# Patient Record
Sex: MALE | Race: WHITE | NOT HISPANIC OR LATINO | Employment: FULL TIME | URBAN - METROPOLITAN AREA
[De-identification: names, ages, dates, MRNs, and addresses within clinical notes are randomized per-mention and may not be internally consistent; named-entity substitution may affect disease eponyms.]

---

## 2018-10-30 ENCOUNTER — APPOINTMENT (OUTPATIENT)
Dept: PREADMISSION TESTING | Facility: HOSPITAL | Age: 53
End: 2018-10-30

## 2018-10-30 ENCOUNTER — HOSPITAL ENCOUNTER (OUTPATIENT)
Dept: GENERAL RADIOLOGY | Facility: HOSPITAL | Age: 53
Discharge: HOME | End: 2018-10-30
Admitting: ORTHOPAEDIC SURGERY

## 2018-10-30 VITALS
SYSTOLIC BLOOD PRESSURE: 123 MMHG | TEMPERATURE: 97.8 F | BODY MASS INDEX: 27.15 KG/M2 | WEIGHT: 173 LBS | OXYGEN SATURATION: 98 % | HEART RATE: 59 BPM | HEIGHT: 67 IN | RESPIRATION RATE: 20 BRPM | DIASTOLIC BLOOD PRESSURE: 78 MMHG

## 2018-10-30 LAB
25(OH)D3 SERPL-MCNC: 34.5 NG/ML (ref 30–100)
ABO GROUP BLD: NORMAL
ALBUMIN SERPL-MCNC: 4.4 G/DL (ref 3.5–5.2)
ALBUMIN/GLOB SERPL: 1.8 G/DL
ALP SERPL-CCNC: 65 U/L (ref 39–117)
ALT SERPL W P-5'-P-CCNC: 19 U/L (ref 1–41)
ANION GAP SERPL CALCULATED.3IONS-SCNC: 10.4 MMOL/L
APTT PPP: 24 SECONDS (ref 22.7–35.4)
AST SERPL-CCNC: 17 U/L (ref 1–40)
BASOPHILS # BLD AUTO: 0.04 10*3/MM3 (ref 0–0.2)
BASOPHILS NFR BLD AUTO: 0.8 % (ref 0–1.5)
BILIRUB SERPL-MCNC: 0.5 MG/DL (ref 0.1–1.2)
BILIRUB UR QL STRIP: NEGATIVE
BLD GP AB SCN SERPL QL: NEGATIVE
BUN BLD-MCNC: 14 MG/DL (ref 6–20)
BUN/CREAT SERPL: 16.5 (ref 7–25)
CALCIUM SPEC-SCNC: 9.4 MG/DL (ref 8.6–10.5)
CHLORIDE SERPL-SCNC: 102 MMOL/L (ref 98–107)
CLARITY UR: CLEAR
CO2 SERPL-SCNC: 26.6 MMOL/L (ref 22–29)
COLOR UR: YELLOW
CREAT BLD-MCNC: 0.85 MG/DL (ref 0.76–1.27)
DEPRECATED RDW RBC AUTO: 44.7 FL (ref 37–54)
EOSINOPHIL # BLD AUTO: 0.02 10*3/MM3 (ref 0–0.7)
EOSINOPHIL NFR BLD AUTO: 0.4 % (ref 0.3–6.2)
ERYTHROCYTE [DISTWIDTH] IN BLOOD BY AUTOMATED COUNT: 12.8 % (ref 11.5–14.5)
GFR SERPL CREATININE-BSD FRML MDRD: 95 ML/MIN/1.73
GLOBULIN UR ELPH-MCNC: 2.5 GM/DL
GLUCOSE BLD-MCNC: 101 MG/DL (ref 65–99)
GLUCOSE UR STRIP-MCNC: NEGATIVE MG/DL
HCT VFR BLD AUTO: 48.4 % (ref 40.4–52.2)
HGB BLD-MCNC: 15.1 G/DL (ref 13.7–17.6)
HGB UR QL STRIP.AUTO: NEGATIVE
IMM GRANULOCYTES # BLD: 0 10*3/MM3 (ref 0–0.03)
IMM GRANULOCYTES NFR BLD: 0 % (ref 0–0.5)
INR PPP: 0.96 (ref 0.9–1.1)
KETONES UR QL STRIP: NEGATIVE
LEUKOCYTE ESTERASE UR QL STRIP.AUTO: NEGATIVE
LYMPHOCYTES # BLD AUTO: 1.77 10*3/MM3 (ref 0.9–4.8)
LYMPHOCYTES NFR BLD AUTO: 36.9 % (ref 19.6–45.3)
MCH RBC QN AUTO: 29.7 PG (ref 27–32.7)
MCHC RBC AUTO-ENTMCNC: 31.2 G/DL (ref 32.6–36.4)
MCV RBC AUTO: 95.1 FL (ref 79.8–96.2)
MONOCYTES # BLD AUTO: 0.29 10*3/MM3 (ref 0.2–1.2)
MONOCYTES NFR BLD AUTO: 6 % (ref 5–12)
NEUTROPHILS # BLD AUTO: 2.68 10*3/MM3 (ref 1.9–8.1)
NEUTROPHILS NFR BLD AUTO: 55.9 % (ref 42.7–76)
NITRITE UR QL STRIP: NEGATIVE
PH UR STRIP.AUTO: 5.5 [PH] (ref 5–8)
PLATELET # BLD AUTO: 219 10*3/MM3 (ref 140–500)
PMV BLD AUTO: 10.3 FL (ref 6–12)
POTASSIUM BLD-SCNC: 4.5 MMOL/L (ref 3.5–5.2)
PROT SERPL-MCNC: 6.9 G/DL (ref 6–8.5)
PROT UR QL STRIP: NEGATIVE
PROTHROMBIN TIME: 12.6 SECONDS (ref 11.7–14.2)
RBC # BLD AUTO: 5.09 10*6/MM3 (ref 4.6–6)
RH BLD: POSITIVE
SODIUM BLD-SCNC: 139 MMOL/L (ref 136–145)
SP GR UR STRIP: 1.02 (ref 1–1.03)
T&S EXPIRATION DATE: NORMAL
UROBILINOGEN UR QL STRIP: NORMAL
WBC NRBC COR # BLD: 4.8 10*3/MM3 (ref 4.5–10.7)

## 2018-10-30 PROCEDURE — 85610 PROTHROMBIN TIME: CPT | Performed by: ORTHOPAEDIC SURGERY

## 2018-10-30 PROCEDURE — 80053 COMPREHEN METABOLIC PANEL: CPT | Performed by: ORTHOPAEDIC SURGERY

## 2018-10-30 PROCEDURE — 93010 ELECTROCARDIOGRAM REPORT: CPT | Performed by: INTERNAL MEDICINE

## 2018-10-30 PROCEDURE — 86850 RBC ANTIBODY SCREEN: CPT | Performed by: ORTHOPAEDIC SURGERY

## 2018-10-30 PROCEDURE — 71046 X-RAY EXAM CHEST 2 VIEWS: CPT

## 2018-10-30 PROCEDURE — 81003 URINALYSIS AUTO W/O SCOPE: CPT | Performed by: ORTHOPAEDIC SURGERY

## 2018-10-30 PROCEDURE — 36415 COLL VENOUS BLD VENIPUNCTURE: CPT

## 2018-10-30 PROCEDURE — 85025 COMPLETE CBC W/AUTO DIFF WBC: CPT | Performed by: ORTHOPAEDIC SURGERY

## 2018-10-30 PROCEDURE — 85730 THROMBOPLASTIN TIME PARTIAL: CPT | Performed by: ORTHOPAEDIC SURGERY

## 2018-10-30 PROCEDURE — 82306 VITAMIN D 25 HYDROXY: CPT | Performed by: ORTHOPAEDIC SURGERY

## 2018-10-30 PROCEDURE — 86900 BLOOD TYPING SEROLOGIC ABO: CPT | Performed by: ORTHOPAEDIC SURGERY

## 2018-10-30 PROCEDURE — 86901 BLOOD TYPING SEROLOGIC RH(D): CPT | Performed by: ORTHOPAEDIC SURGERY

## 2018-10-30 PROCEDURE — 93005 ELECTROCARDIOGRAM TRACING: CPT

## 2018-10-30 RX ORDER — HYDROCODONE BITARTRATE AND ACETAMINOPHEN 7.5; 325 MG/1; MG/1
1 TABLET ORAL EVERY 6 HOURS PRN
COMMUNITY
End: 2018-11-03 | Stop reason: HOSPADM

## 2018-10-30 RX ORDER — ASPIRIN 325 MG
325 TABLET ORAL DAILY
COMMUNITY
End: 2018-11-03 | Stop reason: HOSPADM

## 2018-10-31 ENCOUNTER — HOSPITAL ENCOUNTER (INPATIENT)
Facility: HOSPITAL | Age: 53
LOS: 3 days | Discharge: HOME | End: 2018-11-03
Attending: ORTHOPAEDIC SURGERY | Admitting: ORTHOPAEDIC SURGERY

## 2018-10-31 ENCOUNTER — APPOINTMENT (OUTPATIENT)
Dept: GENERAL RADIOLOGY | Facility: HOSPITAL | Age: 53
End: 2018-10-31

## 2018-10-31 ENCOUNTER — ANESTHESIA (OUTPATIENT)
Dept: PERIOP | Facility: HOSPITAL | Age: 53
End: 2018-10-31

## 2018-10-31 ENCOUNTER — ANESTHESIA EVENT (OUTPATIENT)
Dept: PERIOP | Facility: HOSPITAL | Age: 53
End: 2018-10-31

## 2018-10-31 PROBLEM — M48.02 CERVICAL SPINAL STENOSIS: Status: ACTIVE | Noted: 2018-10-31

## 2018-10-31 PROCEDURE — 25010000002 MIDAZOLAM PER 1 MG

## 2018-10-31 PROCEDURE — C1713 ANCHOR/SCREW BN/BN,TIS/BN: HCPCS | Performed by: ORTHOPAEDIC SURGERY

## 2018-10-31 PROCEDURE — 25010000002 HYDROMORPHONE PER 4 MG: Performed by: ANESTHESIOLOGY

## 2018-10-31 PROCEDURE — 76000 FLUOROSCOPY <1 HR PHYS/QHP: CPT

## 2018-10-31 PROCEDURE — 25010000002 MORPHINE PER 10 MG: Performed by: ANESTHESIOLOGY

## 2018-10-31 PROCEDURE — 25010000002 FENTANYL CITRATE (PF) 100 MCG/2ML SOLUTION: Performed by: NURSE ANESTHETIST, CERTIFIED REGISTERED

## 2018-10-31 PROCEDURE — 25010000002 KETOROLAC TROMETHAMINE PER 15 MG

## 2018-10-31 PROCEDURE — 25010000002 PROPOFOL 10 MG/ML EMULSION: Performed by: NURSE ANESTHETIST, CERTIFIED REGISTERED

## 2018-10-31 PROCEDURE — 25010000002 METHOCARBAMOL 1000 MG/10ML SOLUTION: Performed by: ORTHOPAEDIC SURGERY

## 2018-10-31 PROCEDURE — 0RB30ZZ EXCISION OF CERVICAL VERTEBRAL DISC, OPEN APPROACH: ICD-10-PCS | Performed by: ORTHOPAEDIC SURGERY

## 2018-10-31 PROCEDURE — 25010000002 ONDANSETRON PER 1 MG: Performed by: ANESTHESIOLOGY

## 2018-10-31 PROCEDURE — 25010000002 FENTANYL CITRATE (PF) 100 MCG/2ML SOLUTION: Performed by: ANESTHESIOLOGY

## 2018-10-31 PROCEDURE — 25010000002 SUCCINYLCHOLINE PER 20 MG: Performed by: NURSE ANESTHETIST, CERTIFIED REGISTERED

## 2018-10-31 PROCEDURE — 25010000002 MIDAZOLAM PER 1 MG: Performed by: ANESTHESIOLOGY

## 2018-10-31 PROCEDURE — 0RG20A0 FUSION OF 2 OR MORE CERVICAL VERTEBRAL JOINTS WITH INTERBODY FUSION DEVICE, ANTERIOR APPROACH, ANTERIOR COLUMN, OPEN APPROACH: ICD-10-PCS | Performed by: ORTHOPAEDIC SURGERY

## 2018-10-31 PROCEDURE — 72040 X-RAY EXAM NECK SPINE 2-3 VW: CPT

## 2018-10-31 PROCEDURE — 25010000002 HYDROMORPHONE PER 4 MG: Performed by: NURSE ANESTHETIST, CERTIFIED REGISTERED

## 2018-10-31 PROCEDURE — 25010000003 CEFAZOLIN IN DEXTROSE 2-4 GM/100ML-% SOLUTION: Performed by: ORTHOPAEDIC SURGERY

## 2018-10-31 PROCEDURE — 25010000002 MORPHINE PER 10 MG

## 2018-10-31 DEVICE — NOVEL CERVICAL SPACER, 7 MM LARGE, 7DEG LORDOTIC, PEEK
Type: IMPLANTABLE DEVICE | Site: SPINE CERVICAL | Status: FUNCTIONAL
Brand: NOVEL SPINAL SPACER SYSTEM

## 2018-10-31 DEVICE — ANTERIOR CERVICAL PLATE ASSEMBLY, 4-LEVEL, 064MM
Type: IMPLANTABLE DEVICE | Site: SPINE CERVICAL | Status: FUNCTIONAL
Brand: TRESTLE LUXE

## 2018-10-31 DEVICE — KT HEMOST ABS SURGIFOAM PWDR 1GRAM: Type: IMPLANTABLE DEVICE | Site: SPINE CERVICAL | Status: FUNCTIONAL

## 2018-10-31 DEVICE — 4.0MM VARIABLE ANGLE, SELF-DRILLING HEXALOBE SCREW, 14MM
Type: IMPLANTABLE DEVICE | Site: SPINE CERVICAL | Status: FUNCTIONAL
Brand: TRESTLE LUXE

## 2018-10-31 DEVICE — 4.0MM VARIABLE ANGLE, SELF-DRILLING HEXALOBE SCREW, 12MM
Type: IMPLANTABLE DEVICE | Site: SPINE CERVICAL | Status: FUNCTIONAL
Brand: TRESTLE LUXE

## 2018-10-31 DEVICE — WAX BONE HEMO NAT 2.5G: Type: IMPLANTABLE DEVICE | Site: SPINE CERVICAL | Status: FUNCTIONAL

## 2018-10-31 DEVICE — PUTTY DBM PROGENIX PLS 2.5CC: Type: IMPLANTABLE DEVICE | Site: SPINE CERVICAL | Status: FUNCTIONAL

## 2018-10-31 RX ORDER — GLYCOPYRROLATE 0.2 MG/ML
INJECTION INTRAMUSCULAR; INTRAVENOUS AS NEEDED
Status: DISCONTINUED | OUTPATIENT
Start: 2018-10-31 | End: 2018-10-31 | Stop reason: SURG

## 2018-10-31 RX ORDER — MIDAZOLAM HYDROCHLORIDE 1 MG/ML
2 INJECTION INTRAMUSCULAR; INTRAVENOUS ONCE
Status: COMPLETED | OUTPATIENT
Start: 2018-10-31 | End: 2018-10-31

## 2018-10-31 RX ORDER — METHOCARBAMOL 100 MG/ML
1000 INJECTION, SOLUTION INTRAMUSCULAR; INTRAVENOUS ONCE AS NEEDED
Status: COMPLETED | OUTPATIENT
Start: 2018-10-31 | End: 2018-10-31

## 2018-10-31 RX ORDER — HYDROCODONE BITARTRATE AND ACETAMINOPHEN 10; 325 MG/1; MG/1
1 TABLET ORAL EVERY 4 HOURS PRN
Status: DISCONTINUED | OUTPATIENT
Start: 2018-10-31 | End: 2018-11-01

## 2018-10-31 RX ORDER — HYDROCODONE BITARTRATE AND ACETAMINOPHEN 7.5; 325 MG/1; MG/1
1 TABLET ORAL ONCE AS NEEDED
Status: DISCONTINUED | OUTPATIENT
Start: 2018-10-31 | End: 2018-11-01 | Stop reason: HOSPADM

## 2018-10-31 RX ORDER — MIDAZOLAM HYDROCHLORIDE 1 MG/ML
INJECTION INTRAMUSCULAR; INTRAVENOUS
Status: COMPLETED
Start: 2018-10-31 | End: 2018-10-31

## 2018-10-31 RX ORDER — HYDROMORPHONE HYDROCHLORIDE 1 MG/ML
0.5 INJECTION, SOLUTION INTRAMUSCULAR; INTRAVENOUS; SUBCUTANEOUS
Status: DISCONTINUED | OUTPATIENT
Start: 2018-10-31 | End: 2018-11-01 | Stop reason: HOSPADM

## 2018-10-31 RX ORDER — FENTANYL CITRATE 50 UG/ML
INJECTION, SOLUTION INTRAMUSCULAR; INTRAVENOUS AS NEEDED
Status: DISCONTINUED | OUTPATIENT
Start: 2018-10-31 | End: 2018-10-31 | Stop reason: SURG

## 2018-10-31 RX ORDER — ONDANSETRON 2 MG/ML
4 INJECTION INTRAMUSCULAR; INTRAVENOUS ONCE AS NEEDED
Status: DISCONTINUED | OUTPATIENT
Start: 2018-10-31 | End: 2018-11-01 | Stop reason: HOSPADM

## 2018-10-31 RX ORDER — KETOROLAC TROMETHAMINE 30 MG/ML
30 INJECTION, SOLUTION INTRAMUSCULAR; INTRAVENOUS ONCE
Status: COMPLETED | OUTPATIENT
Start: 2018-10-31 | End: 2018-10-31

## 2018-10-31 RX ORDER — SUCCINYLCHOLINE CHLORIDE 20 MG/ML
INJECTION INTRAMUSCULAR; INTRAVENOUS AS NEEDED
Status: DISCONTINUED | OUTPATIENT
Start: 2018-10-31 | End: 2018-10-31 | Stop reason: SURG

## 2018-10-31 RX ORDER — PROMETHAZINE HYDROCHLORIDE 25 MG/ML
12.5 INJECTION, SOLUTION INTRAMUSCULAR; INTRAVENOUS ONCE AS NEEDED
Status: DISCONTINUED | OUTPATIENT
Start: 2018-10-31 | End: 2018-11-01 | Stop reason: HOSPADM

## 2018-10-31 RX ORDER — MORPHINE SULFATE 2 MG/ML
INJECTION, SOLUTION INTRAMUSCULAR; INTRAVENOUS
Status: COMPLETED
Start: 2018-10-31 | End: 2018-10-31

## 2018-10-31 RX ORDER — SODIUM CHLORIDE, SODIUM LACTATE, POTASSIUM CHLORIDE, CALCIUM CHLORIDE 600; 310; 30; 20 MG/100ML; MG/100ML; MG/100ML; MG/100ML
9 INJECTION, SOLUTION INTRAVENOUS CONTINUOUS
Status: DISCONTINUED | OUTPATIENT
Start: 2018-10-31 | End: 2018-11-03 | Stop reason: HOSPADM

## 2018-10-31 RX ORDER — DIPHENHYDRAMINE HCL 25 MG
25 CAPSULE ORAL EVERY 6 HOURS PRN
Status: DISCONTINUED | OUTPATIENT
Start: 2018-10-31 | End: 2018-11-01 | Stop reason: HOSPADM

## 2018-10-31 RX ORDER — HYDROCODONE BITARTRATE AND ACETAMINOPHEN 10; 325 MG/1; MG/1
1 TABLET ORAL EVERY 4 HOURS PRN
Status: DISCONTINUED | OUTPATIENT
Start: 2018-10-31 | End: 2018-10-31

## 2018-10-31 RX ORDER — OXYCODONE AND ACETAMINOPHEN 7.5; 325 MG/1; MG/1
1 TABLET ORAL ONCE AS NEEDED
Status: COMPLETED | OUTPATIENT
Start: 2018-10-31 | End: 2018-10-31

## 2018-10-31 RX ORDER — CEFAZOLIN SODIUM 2 G/100ML
2 INJECTION, SOLUTION INTRAVENOUS ONCE
Status: COMPLETED | OUTPATIENT
Start: 2018-10-31 | End: 2018-10-31

## 2018-10-31 RX ORDER — NALOXONE HCL 0.4 MG/ML
0.2 VIAL (ML) INJECTION AS NEEDED
Status: DISCONTINUED | OUTPATIENT
Start: 2018-10-31 | End: 2018-11-01 | Stop reason: HOSPADM

## 2018-10-31 RX ORDER — EPHEDRINE SULFATE 50 MG/ML
5 INJECTION, SOLUTION INTRAVENOUS ONCE AS NEEDED
Status: DISCONTINUED | OUTPATIENT
Start: 2018-10-31 | End: 2018-11-01 | Stop reason: HOSPADM

## 2018-10-31 RX ORDER — LIDOCAINE HYDROCHLORIDE 10 MG/ML
0.5 INJECTION, SOLUTION EPIDURAL; INFILTRATION; INTRACAUDAL; PERINEURAL ONCE AS NEEDED
Status: DISCONTINUED | OUTPATIENT
Start: 2018-10-31 | End: 2018-10-31 | Stop reason: HOSPADM

## 2018-10-31 RX ORDER — SODIUM CHLORIDE 0.9 % (FLUSH) 0.9 %
3-10 SYRINGE (ML) INJECTION AS NEEDED
Status: DISCONTINUED | OUTPATIENT
Start: 2018-10-31 | End: 2018-10-31 | Stop reason: HOSPADM

## 2018-10-31 RX ORDER — LIDOCAINE HYDROCHLORIDE 20 MG/ML
INJECTION, SOLUTION INFILTRATION; PERINEURAL AS NEEDED
Status: DISCONTINUED | OUTPATIENT
Start: 2018-10-31 | End: 2018-10-31 | Stop reason: SURG

## 2018-10-31 RX ORDER — FENTANYL CITRATE 50 UG/ML
100 INJECTION, SOLUTION INTRAMUSCULAR; INTRAVENOUS
Status: DISCONTINUED | OUTPATIENT
Start: 2018-10-31 | End: 2018-10-31 | Stop reason: HOSPADM

## 2018-10-31 RX ORDER — HYDROMORPHONE HCL IN 0.9% NACL 10 MG/50ML
PATIENT CONTROLLED ANALGESIA SYRINGE INTRAVENOUS CONTINUOUS
Status: DISPENSED | OUTPATIENT
Start: 2018-10-31 | End: 2018-11-01

## 2018-10-31 RX ORDER — ONDANSETRON 2 MG/ML
INJECTION INTRAMUSCULAR; INTRAVENOUS AS NEEDED
Status: DISCONTINUED | OUTPATIENT
Start: 2018-10-31 | End: 2018-10-31 | Stop reason: SURG

## 2018-10-31 RX ORDER — SODIUM CHLORIDE 0.9 % (FLUSH) 0.9 %
3 SYRINGE (ML) INJECTION EVERY 12 HOURS SCHEDULED
Status: DISCONTINUED | OUTPATIENT
Start: 2018-10-31 | End: 2018-10-31 | Stop reason: HOSPADM

## 2018-10-31 RX ORDER — PROMETHAZINE HYDROCHLORIDE 25 MG/1
25 SUPPOSITORY RECTAL ONCE AS NEEDED
Status: DISCONTINUED | OUTPATIENT
Start: 2018-10-31 | End: 2018-11-01 | Stop reason: HOSPADM

## 2018-10-31 RX ORDER — CYCLOBENZAPRINE HCL 10 MG
10 TABLET ORAL 3 TIMES DAILY PRN
Qty: 90 TABLET | Refills: 0 | Status: SHIPPED | OUTPATIENT
Start: 2018-10-31 | End: 2018-11-03 | Stop reason: HOSPADM

## 2018-10-31 RX ORDER — HYDROMORPHONE HCL 110MG/55ML
PATIENT CONTROLLED ANALGESIA SYRINGE INTRAVENOUS AS NEEDED
Status: DISCONTINUED | OUTPATIENT
Start: 2018-10-31 | End: 2018-10-31 | Stop reason: SURG

## 2018-10-31 RX ORDER — MORPHINE SULFATE 2 MG/ML
1 INJECTION, SOLUTION INTRAMUSCULAR; INTRAVENOUS
Status: DISCONTINUED | OUTPATIENT
Start: 2018-10-31 | End: 2018-11-01 | Stop reason: HOSPADM

## 2018-10-31 RX ORDER — FLUMAZENIL 0.1 MG/ML
0.2 INJECTION INTRAVENOUS AS NEEDED
Status: DISCONTINUED | OUTPATIENT
Start: 2018-10-31 | End: 2018-11-01 | Stop reason: HOSPADM

## 2018-10-31 RX ORDER — MIDAZOLAM HYDROCHLORIDE 1 MG/ML
1 INJECTION INTRAMUSCULAR; INTRAVENOUS
Status: DISCONTINUED | OUTPATIENT
Start: 2018-10-31 | End: 2018-10-31 | Stop reason: HOSPADM

## 2018-10-31 RX ORDER — PROMETHAZINE HYDROCHLORIDE 25 MG/1
25 TABLET ORAL ONCE AS NEEDED
Status: DISCONTINUED | OUTPATIENT
Start: 2018-10-31 | End: 2018-11-01 | Stop reason: HOSPADM

## 2018-10-31 RX ORDER — HYDROCODONE BITARTRATE AND ACETAMINOPHEN 10; 325 MG/1; MG/1
TABLET ORAL
Qty: 60 TABLET | Refills: 0 | Status: SHIPPED | OUTPATIENT
Start: 2018-10-31 | End: 2018-11-03 | Stop reason: HOSPADM

## 2018-10-31 RX ORDER — KETOROLAC TROMETHAMINE 30 MG/ML
INJECTION, SOLUTION INTRAMUSCULAR; INTRAVENOUS
Status: COMPLETED
Start: 2018-10-31 | End: 2018-10-31

## 2018-10-31 RX ORDER — HYDROCODONE BITARTRATE AND ACETAMINOPHEN 10; 325 MG/1; MG/1
2 TABLET ORAL EVERY 4 HOURS PRN
Status: DISCONTINUED | OUTPATIENT
Start: 2018-10-31 | End: 2018-11-01

## 2018-10-31 RX ORDER — MIDAZOLAM HYDROCHLORIDE 1 MG/ML
2 INJECTION INTRAMUSCULAR; INTRAVENOUS
Status: DISCONTINUED | OUTPATIENT
Start: 2018-10-31 | End: 2018-10-31 | Stop reason: HOSPADM

## 2018-10-31 RX ORDER — PROPOFOL 10 MG/ML
VIAL (ML) INTRAVENOUS AS NEEDED
Status: DISCONTINUED | OUTPATIENT
Start: 2018-10-31 | End: 2018-10-31 | Stop reason: SURG

## 2018-10-31 RX ORDER — FAMOTIDINE 10 MG/ML
20 INJECTION, SOLUTION INTRAVENOUS ONCE
Status: COMPLETED | OUTPATIENT
Start: 2018-10-31 | End: 2018-10-31

## 2018-10-31 RX ORDER — SODIUM CHLORIDE 0.9 % (FLUSH) 0.9 %
1-10 SYRINGE (ML) INJECTION AS NEEDED
Status: DISCONTINUED | OUTPATIENT
Start: 2018-10-31 | End: 2018-10-31 | Stop reason: HOSPADM

## 2018-10-31 RX ORDER — PROMETHAZINE HYDROCHLORIDE 25 MG/1
12.5 TABLET ORAL ONCE AS NEEDED
Status: DISCONTINUED | OUTPATIENT
Start: 2018-10-31 | End: 2018-11-01 | Stop reason: HOSPADM

## 2018-10-31 RX ORDER — LABETALOL HYDROCHLORIDE 5 MG/ML
5 INJECTION, SOLUTION INTRAVENOUS
Status: DISCONTINUED | OUTPATIENT
Start: 2018-10-31 | End: 2018-11-01 | Stop reason: HOSPADM

## 2018-10-31 RX ORDER — CYCLOBENZAPRINE HCL 10 MG
10 TABLET ORAL 3 TIMES DAILY PRN
Status: DISCONTINUED | OUTPATIENT
Start: 2018-10-31 | End: 2018-11-03 | Stop reason: HOSPADM

## 2018-10-31 RX ORDER — FENTANYL CITRATE 50 UG/ML
50 INJECTION, SOLUTION INTRAMUSCULAR; INTRAVENOUS
Status: DISCONTINUED | OUTPATIENT
Start: 2018-10-31 | End: 2018-11-01 | Stop reason: HOSPADM

## 2018-10-31 RX ADMIN — FENTANYL CITRATE 50 MCG: 50 INJECTION, SOLUTION INTRAMUSCULAR; INTRAVENOUS at 20:05

## 2018-10-31 RX ADMIN — ONDANSETRON 4 MG: 2 INJECTION INTRAMUSCULAR; INTRAVENOUS at 19:28

## 2018-10-31 RX ADMIN — FENTANYL CITRATE 50 MCG: 50 INJECTION, SOLUTION INTRAMUSCULAR; INTRAVENOUS at 21:25

## 2018-10-31 RX ADMIN — LIDOCAINE HYDROCHLORIDE 100 MG: 20 INJECTION, SOLUTION INFILTRATION; PERINEURAL at 16:43

## 2018-10-31 RX ADMIN — HYDROMORPHONE HYDROCHLORIDE 0.5 MG: 2 INJECTION INTRAMUSCULAR; INTRAVENOUS; SUBCUTANEOUS at 17:40

## 2018-10-31 RX ADMIN — METHOCARBAMOL 1000 MG: 100 INJECTION, SOLUTION INTRAMUSCULAR; INTRAVENOUS at 20:30

## 2018-10-31 RX ADMIN — MORPHINE SULFATE 1 MG: 2 INJECTION, SOLUTION INTRAMUSCULAR; INTRAVENOUS at 23:14

## 2018-10-31 RX ADMIN — OXYCODONE HYDROCHLORIDE AND ACETAMINOPHEN 1 TABLET: 7.5; 325 TABLET ORAL at 21:58

## 2018-10-31 RX ADMIN — KETOROLAC TROMETHAMINE 30 MG: 30 INJECTION, SOLUTION INTRAMUSCULAR at 22:55

## 2018-10-31 RX ADMIN — CYCLOBENZAPRINE 10 MG: 10 TABLET, FILM COATED ORAL at 21:42

## 2018-10-31 RX ADMIN — FENTANYL CITRATE 50 MCG: 50 INJECTION INTRAMUSCULAR; INTRAVENOUS at 13:37

## 2018-10-31 RX ADMIN — MORPHINE SULFATE 1 MG: 2 INJECTION, SOLUTION INTRAMUSCULAR; INTRAVENOUS at 22:46

## 2018-10-31 RX ADMIN — FAMOTIDINE 20 MG: 10 INJECTION, SOLUTION INTRAVENOUS at 13:00

## 2018-10-31 RX ADMIN — HYDROMORPHONE HYDROCHLORIDE 0.5 MG: 1 INJECTION, SOLUTION INTRAMUSCULAR; INTRAVENOUS; SUBCUTANEOUS at 21:35

## 2018-10-31 RX ADMIN — CEFAZOLIN SODIUM 2 G: 2 INJECTION, SOLUTION INTRAVENOUS at 16:50

## 2018-10-31 RX ADMIN — FENTANYL CITRATE 50 MCG: 50 INJECTION INTRAMUSCULAR; INTRAVENOUS at 13:01

## 2018-10-31 RX ADMIN — MIDAZOLAM 2 MG: 1 INJECTION INTRAMUSCULAR; INTRAVENOUS at 15:30

## 2018-10-31 RX ADMIN — FENTANYL CITRATE 50 MCG: 50 INJECTION, SOLUTION INTRAMUSCULAR; INTRAVENOUS at 17:20

## 2018-10-31 RX ADMIN — FENTANYL CITRATE 100 MCG: 50 INJECTION, SOLUTION INTRAMUSCULAR; INTRAVENOUS at 17:04

## 2018-10-31 RX ADMIN — FENTANYL CITRATE 50 MCG: 50 INJECTION, SOLUTION INTRAMUSCULAR; INTRAVENOUS at 19:55

## 2018-10-31 RX ADMIN — PROPOFOL 100 MG: 10 INJECTION, EMULSION INTRAVENOUS at 17:53

## 2018-10-31 RX ADMIN — HYDROMORPHONE HYDROCHLORIDE 0.5 MG: 2 INJECTION INTRAMUSCULAR; INTRAVENOUS; SUBCUTANEOUS at 17:47

## 2018-10-31 RX ADMIN — GLYCOPYRROLATE 0.2 MG: 0.2 INJECTION INTRAMUSCULAR; INTRAVENOUS at 17:47

## 2018-10-31 RX ADMIN — SODIUM CHLORIDE, POTASSIUM CHLORIDE, SODIUM LACTATE AND CALCIUM CHLORIDE: 600; 310; 30; 20 INJECTION, SOLUTION INTRAVENOUS at 19:43

## 2018-10-31 RX ADMIN — HYDROMORPHONE HYDROCHLORIDE: 10 INJECTION INTRAMUSCULAR; INTRAVENOUS; SUBCUTANEOUS at 20:35

## 2018-10-31 RX ADMIN — HYDROMORPHONE HYDROCHLORIDE 0.5 MG: 1 INJECTION, SOLUTION INTRAMUSCULAR; INTRAVENOUS; SUBCUTANEOUS at 20:00

## 2018-10-31 RX ADMIN — HYDROMORPHONE HYDROCHLORIDE 0.5 MG: 1 INJECTION, SOLUTION INTRAMUSCULAR; INTRAVENOUS; SUBCUTANEOUS at 22:24

## 2018-10-31 RX ADMIN — FENTANYL CITRATE 100 MCG: 50 INJECTION, SOLUTION INTRAMUSCULAR; INTRAVENOUS at 16:43

## 2018-10-31 RX ADMIN — MIDAZOLAM 2 MG: 1 INJECTION INTRAMUSCULAR; INTRAVENOUS at 20:55

## 2018-10-31 RX ADMIN — SUCCINYLCHOLINE CHLORIDE 140 MG: 20 INJECTION, SOLUTION INTRAMUSCULAR; INTRAVENOUS; PARENTERAL at 16:43

## 2018-10-31 RX ADMIN — EPHEDRINE SULFATE 15 MG: 50 INJECTION INTRAMUSCULAR; INTRAVENOUS; SUBCUTANEOUS at 17:47

## 2018-10-31 RX ADMIN — HYDROMORPHONE HYDROCHLORIDE 0.5 MG: 1 INJECTION, SOLUTION INTRAMUSCULAR; INTRAVENOUS; SUBCUTANEOUS at 22:33

## 2018-10-31 RX ADMIN — HYDROMORPHONE HYDROCHLORIDE 0.5 MG: 1 INJECTION, SOLUTION INTRAMUSCULAR; INTRAVENOUS; SUBCUTANEOUS at 20:25

## 2018-10-31 RX ADMIN — KETOROLAC TROMETHAMINE 30 MG: 30 INJECTION, SOLUTION INTRAMUSCULAR; INTRAVENOUS at 22:55

## 2018-10-31 RX ADMIN — MIDAZOLAM 2 MG: 1 INJECTION INTRAMUSCULAR; INTRAVENOUS at 13:00

## 2018-10-31 RX ADMIN — MORPHINE SULFATE 1 MG: 2 INJECTION, SOLUTION INTRAMUSCULAR; INTRAVENOUS at 22:56

## 2018-10-31 RX ADMIN — HYDROMORPHONE HYDROCHLORIDE 0.5 MG: 1 INJECTION, SOLUTION INTRAMUSCULAR; INTRAVENOUS; SUBCUTANEOUS at 21:34

## 2018-10-31 RX ADMIN — MIDAZOLAM HYDROCHLORIDE 2 MG: 1 INJECTION INTRAMUSCULAR; INTRAVENOUS at 20:55

## 2018-10-31 RX ADMIN — PROPOFOL 200 MG: 10 INJECTION, EMULSION INTRAVENOUS at 16:43

## 2018-10-31 RX ADMIN — FENTANYL CITRATE 50 MCG: 50 INJECTION, SOLUTION INTRAMUSCULAR; INTRAVENOUS at 20:20

## 2018-10-31 RX ADMIN — MORPHINE SULFATE 1 MG: 2 INJECTION, SOLUTION INTRAMUSCULAR; INTRAVENOUS at 23:42

## 2018-10-31 RX ADMIN — SODIUM CHLORIDE, POTASSIUM CHLORIDE, SODIUM LACTATE AND CALCIUM CHLORIDE 9 ML/HR: 600; 310; 30; 20 INJECTION, SOLUTION INTRAVENOUS at 13:18

## 2018-10-31 NOTE — ANESTHESIA PROCEDURE NOTES
Airway  Urgency: elective    Difficult airway    General Information and Staff    Patient location during procedure: OR  Anesthesiologist: SUNDAR FLOWER  CRNA: RAMSES BADILLO    Indications and Patient Condition  Indications for airway management: airway protection    Preoxygenated: yes  Mask difficulty assessment: 2 - vent by mask + OA or adjuvant +/- NMBA    Final Airway Details  Final airway type: endotracheal airway      Successful airway: ETT and NIM tube  Cuffed: yes   Successful intubation technique: video laryngoscopy  Endotracheal tube insertion site: oral  ETT size: 7.0 mm  Cormack-Lehane Classification: grade I - full view of glottis  Placement verified by: chest auscultation and capnometry     Additional Comments  Glide scope advanced atraumatically and intubated without difficulty.  Lips and teeth remain unchanged as preop condition.

## 2018-10-31 NOTE — ANESTHESIA PREPROCEDURE EVALUATION
Anesthesia Evaluation     Patient summary reviewed and Nursing notes reviewed   NPO Solid Status: > 8 hours             Airway   Mallampati: II  TM distance: <3 FB  Neck ROM: limited  no difficulty expected  Dental - normal exam     Pulmonary - negative pulmonary ROS and normal exam   Cardiovascular - negative cardio ROS and normal exam        Neuro/Psych- negative ROS  GI/Hepatic/Renal/Endo - negative ROS     Musculoskeletal (-) negative ROS    Abdominal  - normal exam   Substance History - negative use     OB/GYN negative ob/gyn ROS         Other                        Anesthesia Plan    ASA 2     general     intravenous induction   Anesthetic plan, all risks, benefits, and alternatives have been provided, discussed and informed consent has been obtained with: patient.    Plan discussed with CRNA.

## 2018-11-01 PROBLEM — F43.12 CHRONIC POST-TRAUMATIC STRESS DISORDER (PTSD): Status: ACTIVE | Noted: 2018-11-01

## 2018-11-01 PROBLEM — F41.9 ANXIETY: Status: ACTIVE | Noted: 2018-11-01

## 2018-11-01 PROBLEM — M19.90 ARTHRITIS: Status: ACTIVE | Noted: 2018-11-01

## 2018-11-01 LAB
ANION GAP SERPL CALCULATED.3IONS-SCNC: 10.1 MMOL/L
BUN BLD-MCNC: 9 MG/DL (ref 6–20)
BUN/CREAT SERPL: 11.4 (ref 7–25)
CALCIUM SPEC-SCNC: 8.5 MG/DL (ref 8.6–10.5)
CHLORIDE SERPL-SCNC: 100 MMOL/L (ref 98–107)
CO2 SERPL-SCNC: 25.9 MMOL/L (ref 22–29)
CREAT BLD-MCNC: 0.79 MG/DL (ref 0.76–1.27)
GFR SERPL CREATININE-BSD FRML MDRD: 103 ML/MIN/1.73
GLUCOSE BLD-MCNC: 103 MG/DL (ref 65–99)
HCT VFR BLD AUTO: 41 % (ref 40.4–52.2)
HGB BLD-MCNC: 12.8 G/DL (ref 13.7–17.6)
POTASSIUM BLD-SCNC: 4.1 MMOL/L (ref 3.5–5.2)
SODIUM BLD-SCNC: 136 MMOL/L (ref 136–145)

## 2018-11-01 PROCEDURE — 85014 HEMATOCRIT: CPT | Performed by: ORTHOPAEDIC SURGERY

## 2018-11-01 PROCEDURE — 63710000001 PROMETHAZINE PER 12.5 MG: Performed by: ORTHOPAEDIC SURGERY

## 2018-11-01 PROCEDURE — 25010000002 ONDANSETRON PER 1 MG: Performed by: ORTHOPAEDIC SURGERY

## 2018-11-01 PROCEDURE — 97162 PT EVAL MOD COMPLEX 30 MIN: CPT

## 2018-11-01 PROCEDURE — 85018 HEMOGLOBIN: CPT | Performed by: ORTHOPAEDIC SURGERY

## 2018-11-01 PROCEDURE — 97110 THERAPEUTIC EXERCISES: CPT

## 2018-11-01 PROCEDURE — 80048 BASIC METABOLIC PNL TOTAL CA: CPT | Performed by: ORTHOPAEDIC SURGERY

## 2018-11-01 PROCEDURE — 25010000003 CEFAZOLIN IN DEXTROSE 2-4 GM/100ML-% SOLUTION: Performed by: ORTHOPAEDIC SURGERY

## 2018-11-01 PROCEDURE — 63710000001 METHYLPREDNISOLONE 4 MG TABLET THERAPY PACK 21 EACH DISP PACK: Performed by: PHYSICIAN ASSISTANT

## 2018-11-01 RX ORDER — CEFAZOLIN SODIUM 2 G/100ML
2 INJECTION, SOLUTION INTRAVENOUS EVERY 8 HOURS
Status: DISCONTINUED | OUTPATIENT
Start: 2018-11-01 | End: 2018-11-01

## 2018-11-01 RX ORDER — ONDANSETRON 4 MG/1
4 TABLET, FILM COATED ORAL EVERY 6 HOURS PRN
Status: DISCONTINUED | OUTPATIENT
Start: 2018-11-01 | End: 2018-11-03 | Stop reason: HOSPADM

## 2018-11-01 RX ORDER — BISACODYL 5 MG/1
5 TABLET, DELAYED RELEASE ORAL DAILY PRN
Status: DISCONTINUED | OUTPATIENT
Start: 2018-11-01 | End: 2018-11-03 | Stop reason: HOSPADM

## 2018-11-01 RX ORDER — BISACODYL 10 MG
10 SUPPOSITORY, RECTAL RECTAL DAILY PRN
Status: DISCONTINUED | OUTPATIENT
Start: 2018-11-01 | End: 2018-11-03 | Stop reason: HOSPADM

## 2018-11-01 RX ORDER — METHYLPREDNISOLONE 4 MG/1
4 TABLET ORAL
Status: DISCONTINUED | OUTPATIENT
Start: 2018-11-04 | End: 2018-11-03 | Stop reason: HOSPADM

## 2018-11-01 RX ORDER — SENNA AND DOCUSATE SODIUM 50; 8.6 MG/1; MG/1
1 TABLET, FILM COATED ORAL NIGHTLY PRN
Status: DISCONTINUED | OUTPATIENT
Start: 2018-11-01 | End: 2018-11-03 | Stop reason: HOSPADM

## 2018-11-01 RX ORDER — METHYLPREDNISOLONE 4 MG/1
4 TABLET ORAL
Status: COMPLETED | OUTPATIENT
Start: 2018-11-01 | End: 2018-11-01

## 2018-11-01 RX ORDER — PROMETHAZINE HYDROCHLORIDE 25 MG/ML
12.5 INJECTION, SOLUTION INTRAMUSCULAR; INTRAVENOUS EVERY 6 HOURS PRN
Status: DISCONTINUED | OUTPATIENT
Start: 2018-11-01 | End: 2018-11-03 | Stop reason: HOSPADM

## 2018-11-01 RX ORDER — METHYLPREDNISOLONE 4 MG/1
4 TABLET ORAL
Status: DISCONTINUED | OUTPATIENT
Start: 2018-11-06 | End: 2018-11-03 | Stop reason: HOSPADM

## 2018-11-01 RX ORDER — ONDANSETRON 4 MG/1
4 TABLET, ORALLY DISINTEGRATING ORAL EVERY 6 HOURS PRN
Status: DISCONTINUED | OUTPATIENT
Start: 2018-11-01 | End: 2018-11-03 | Stop reason: HOSPADM

## 2018-11-01 RX ORDER — METHYLPREDNISOLONE 4 MG/1
8 TABLET ORAL
Status: COMPLETED | OUTPATIENT
Start: 2018-11-02 | End: 2018-11-02

## 2018-11-01 RX ORDER — ONDANSETRON 2 MG/ML
4 INJECTION INTRAMUSCULAR; INTRAVENOUS EVERY 6 HOURS PRN
Status: DISCONTINUED | OUTPATIENT
Start: 2018-11-01 | End: 2018-11-03 | Stop reason: HOSPADM

## 2018-11-01 RX ORDER — METHYLPREDNISOLONE 4 MG/1
8 TABLET ORAL
Status: COMPLETED | OUTPATIENT
Start: 2018-11-01 | End: 2018-11-01

## 2018-11-01 RX ORDER — OXYCODONE HYDROCHLORIDE AND ACETAMINOPHEN 5; 325 MG/1; MG/1
2 TABLET ORAL EVERY 4 HOURS PRN
Status: DISCONTINUED | OUTPATIENT
Start: 2018-11-01 | End: 2018-11-03 | Stop reason: HOSPADM

## 2018-11-01 RX ORDER — METHYLPREDNISOLONE 4 MG/1
4 TABLET ORAL 2 TIMES DAILY
Status: DISCONTINUED | OUTPATIENT
Start: 2018-11-05 | End: 2018-11-03 | Stop reason: HOSPADM

## 2018-11-01 RX ORDER — DIAZEPAM 2 MG/1
2 TABLET ORAL EVERY 4 HOURS PRN
Status: DISCONTINUED | OUTPATIENT
Start: 2018-11-01 | End: 2018-11-03 | Stop reason: HOSPADM

## 2018-11-01 RX ORDER — SODIUM CHLORIDE 0.9 % (FLUSH) 0.9 %
3 SYRINGE (ML) INJECTION EVERY 12 HOURS SCHEDULED
Status: DISCONTINUED | OUTPATIENT
Start: 2018-11-01 | End: 2018-11-03 | Stop reason: HOSPADM

## 2018-11-01 RX ORDER — DOCUSATE SODIUM 100 MG/1
100 CAPSULE, LIQUID FILLED ORAL 2 TIMES DAILY PRN
Status: DISCONTINUED | OUTPATIENT
Start: 2018-11-01 | End: 2018-11-03 | Stop reason: HOSPADM

## 2018-11-01 RX ORDER — SODIUM CHLORIDE 450 MG/100ML
100 INJECTION, SOLUTION INTRAVENOUS CONTINUOUS
Status: DISCONTINUED | OUTPATIENT
Start: 2018-11-01 | End: 2018-11-03 | Stop reason: HOSPADM

## 2018-11-01 RX ORDER — HYDROMORPHONE HCL IN 0.9% NACL 10 MG/50ML
PATIENT CONTROLLED ANALGESIA SYRINGE INTRAVENOUS CONTINUOUS
Status: DISCONTINUED | OUTPATIENT
Start: 2018-11-01 | End: 2018-11-03 | Stop reason: HOSPADM

## 2018-11-01 RX ORDER — DIPHENHYDRAMINE HYDROCHLORIDE 50 MG/ML
25 INJECTION INTRAMUSCULAR; INTRAVENOUS EVERY 6 HOURS PRN
Status: DISCONTINUED | OUTPATIENT
Start: 2018-11-01 | End: 2018-11-03 | Stop reason: HOSPADM

## 2018-11-01 RX ORDER — PROMETHAZINE HYDROCHLORIDE 25 MG/1
12.5 SUPPOSITORY RECTAL EVERY 6 HOURS PRN
Status: DISCONTINUED | OUTPATIENT
Start: 2018-11-01 | End: 2018-11-03 | Stop reason: HOSPADM

## 2018-11-01 RX ORDER — PROMETHAZINE HYDROCHLORIDE 12.5 MG/1
12.5 TABLET ORAL EVERY 6 HOURS PRN
Status: DISCONTINUED | OUTPATIENT
Start: 2018-11-01 | End: 2018-11-03 | Stop reason: HOSPADM

## 2018-11-01 RX ORDER — NALOXONE HCL 0.4 MG/ML
0.1 VIAL (ML) INJECTION
Status: DISCONTINUED | OUTPATIENT
Start: 2018-11-01 | End: 2018-11-03 | Stop reason: HOSPADM

## 2018-11-01 RX ORDER — METHYLPREDNISOLONE 4 MG/1
4 TABLET ORAL
Status: DISCONTINUED | OUTPATIENT
Start: 2018-11-03 | End: 2018-11-03 | Stop reason: HOSPADM

## 2018-11-01 RX ORDER — METHYLPREDNISOLONE 4 MG/1
4 TABLET ORAL
Status: COMPLETED | OUTPATIENT
Start: 2018-11-02 | End: 2018-11-02

## 2018-11-01 RX ORDER — CEFAZOLIN SODIUM 2 G/100ML
2 INJECTION, SOLUTION INTRAVENOUS EVERY 8 HOURS
Status: COMPLETED | OUTPATIENT
Start: 2018-11-01 | End: 2018-11-03

## 2018-11-01 RX ORDER — SODIUM CHLORIDE 0.9 % (FLUSH) 0.9 %
3-10 SYRINGE (ML) INJECTION AS NEEDED
Status: DISCONTINUED | OUTPATIENT
Start: 2018-11-01 | End: 2018-11-03 | Stop reason: HOSPADM

## 2018-11-01 RX ORDER — DIAZEPAM 2 MG/1
2 TABLET ORAL EVERY 6 HOURS PRN
Status: DISCONTINUED | OUTPATIENT
Start: 2018-11-01 | End: 2018-11-01

## 2018-11-01 RX ADMIN — PROMETHAZINE HYDROCHLORIDE 12.5 MG: 12.5 TABLET ORAL at 13:25

## 2018-11-01 RX ADMIN — Medication 3 ML: at 02:57

## 2018-11-01 RX ADMIN — CEFAZOLIN SODIUM 2 G: 2 INJECTION, SOLUTION INTRAVENOUS at 09:44

## 2018-11-01 RX ADMIN — DIAZEPAM 2 MG: 2 TABLET ORAL at 22:24

## 2018-11-01 RX ADMIN — HYDROCODONE BITARTRATE AND ACETAMINOPHEN 2 TABLET: 10; 325 TABLET ORAL at 05:38

## 2018-11-01 RX ADMIN — CYCLOBENZAPRINE 10 MG: 10 TABLET, FILM COATED ORAL at 13:25

## 2018-11-01 RX ADMIN — HYDROCODONE BITARTRATE AND ACETAMINOPHEN 2 TABLET: 10; 325 TABLET ORAL at 01:36

## 2018-11-01 RX ADMIN — OXYCODONE HYDROCHLORIDE AND ACETAMINOPHEN 2 TABLET: 5; 325 TABLET ORAL at 13:52

## 2018-11-01 RX ADMIN — CEFAZOLIN SODIUM 2 G: 2 INJECTION, SOLUTION INTRAVENOUS at 02:53

## 2018-11-01 RX ADMIN — METHYLPREDNISOLONE 4 MG: 4 TABLET ORAL at 19:36

## 2018-11-01 RX ADMIN — METHYLPREDNISOLONE 8 MG: 4 TABLET ORAL at 21:30

## 2018-11-01 RX ADMIN — SODIUM CHLORIDE 100 ML/HR: 4.5 INJECTION, SOLUTION INTRAVENOUS at 00:24

## 2018-11-01 RX ADMIN — OXYCODONE HYDROCHLORIDE AND ACETAMINOPHEN 2 TABLET: 5; 325 TABLET ORAL at 17:46

## 2018-11-01 RX ADMIN — CYCLOBENZAPRINE 10 MG: 10 TABLET, FILM COATED ORAL at 21:30

## 2018-11-01 RX ADMIN — Medication 3 ML: at 08:45

## 2018-11-01 RX ADMIN — CYCLOBENZAPRINE 10 MG: 10 TABLET, FILM COATED ORAL at 05:38

## 2018-11-01 RX ADMIN — METHYLPREDNISOLONE 4 MG: 4 TABLET ORAL at 15:45

## 2018-11-01 RX ADMIN — OXYCODONE HYDROCHLORIDE AND ACETAMINOPHEN 2 TABLET: 5; 325 TABLET ORAL at 09:43

## 2018-11-01 RX ADMIN — Medication 3 ML: at 21:31

## 2018-11-01 RX ADMIN — ONDANSETRON 4 MG: 2 INJECTION INTRAMUSCULAR; INTRAVENOUS at 12:45

## 2018-11-01 RX ADMIN — DIAZEPAM 2 MG: 2 TABLET ORAL at 17:43

## 2018-11-01 RX ADMIN — DIAZEPAM 2 MG: 2 TABLET ORAL at 08:43

## 2018-11-01 RX ADMIN — DOCUSATE SODIUM 100 MG: 100 CAPSULE, LIQUID FILLED ORAL at 08:43

## 2018-11-01 RX ADMIN — OXYCODONE HYDROCHLORIDE AND ACETAMINOPHEN 2 TABLET: 5; 325 TABLET ORAL at 22:24

## 2018-11-01 RX ADMIN — DIAZEPAM 2 MG: 2 TABLET ORAL at 13:52

## 2018-11-01 RX ADMIN — CEFAZOLIN SODIUM 2 G: 2 INJECTION, SOLUTION INTRAVENOUS at 18:22

## 2018-11-01 NOTE — THERAPY EVALUATION
Acute Care - Physical Therapy Initial Evaluation  Psychiatric     Patient Name: Joey Byrnes  : 1965  MRN: [id number]  Today's Date: 2018   Onset of Illness/Injury or Date of Surgery: 10/31/18            Admit Date: 10/31/2018    Visit Dx: No diagnosis found.  Patient Active Problem List   Diagnosis   • Cervical spinal stenosis   • Chronic post-traumatic stress disorder (PTSD)   • Arthritis   • Anxiety     Past Medical History:   Diagnosis Date   • Anxiety    • Arthritis    • Back pain    • Chronic post-traumatic stress disorder (PTSD)    • Depression    • History of concussion    • History of mononucleosis    • Migraine      Past Surgical History:   Procedure Laterality Date   • ANTERIOR CERVICAL DISCECTOMY W/ FUSION N/A 10/31/2018    Procedure: C3-7 CERVICAL DISCECTOMY ANTERIOR FUSION;  Surgeon: Piotr Steven DO;  Location: [facility] MAIN OR;  Service: Orthopedic Spine   • COLONOSCOPY     • INGUINAL HERNIA REPAIR Right    • KNEE ARTHROSCOPY Bilateral     meniscus   • VASECTOMY          PT ASSESSMENT (last 12 hours)      Physical Therapy Evaluation     Row Name 18 1428          PT Evaluation Time/Intention    Subjective Information complains of;pain  -AR     Document Type evaluation  -AR     Mode of Treatment physical therapy  -AR     Patient Effort good  -AR     Symptoms Noted During/After Treatment increased pain  -AR     Row Name 18 1428          General Information    Patient Profile Reviewed? yes  -AR     Onset of Illness/Injury or Date of Surgery 10/31/18  -AR     Prior Level of Function independent:;work   took 10days off work prior to sx, incontinence/UE weakness  -AR     Equipment Currently Used at Home none  -AR     Pertinent History of Current Functional Problem C3-7 anterior fusion  -AR     Existing Precautions/Restrictions fall;brace worn when out of bed;spinal  -AR     Barriers to Rehab none identified  -AR     Row Name 18 1420          Relationship/Environment    Lives  With spouse  -AR     Row Name 11/01/18 1428          Resource/Environmental Concerns    Current Living Arrangements home/apartment/condo  -AR     Row Name 11/01/18 1428          Home Main Entrance    Number of Stairs, Main Entrance three  -AR     Stair Railings, Main Entrance none  -AR     Row Name 11/01/18 1428          Cognitive Assessment/Intervention- PT/OT    Orientation Status (Cognition) oriented x 3  -AR     Follows Commands (Cognition) WNL  -AR     Row Name 11/01/18 1428          Bed Mobility Assessment/Treatment    Bed Mobility Assessment/Treatment supine-sit  -AR     Supine-Sit [place] (Bed Mobility) contact guard  -AR     Assistive Device (Bed Mobility) bed rails  -AR     Comment (Bed Mobility) log rolling  -AR     Row Name 11/01/18 1428          Transfer Assessment/Treatment    Transfer Assessment/Treatment sit-stand transfer;stand-sit transfer  -AR     Sit-Stand [place] (Transfers) contact guard  -AR     Stand-Sit [place] (Transfers) contact guard  -AR     Row Name 11/01/18 1428          Sit-Stand Transfer    Assistive Device (Sit-Stand Transfers) walker, front-wheeled  -AR     Row Name 11/01/18 1428          Stand-Sit Transfer    Assistive Device (Stand-Sit Transfers) walker, front-wheeled  -AR     Row Name 11/01/18 1428          Gait/Stairs Assessment/Training    [place] Level (Gait) contact guard  -AR     Assistive Device (Gait) walker, front-wheeled  -AR     Distance in Feet (Gait) 5  -AR     Pattern (Gait) swing-through  -AR     Deviations/Abnormal Patterns (Gait) antalgic  -AR     Comment (Gait/Stairs) limited by pain/spasms   -AR     Row Name 11/01/18 1428          General ROM    GENERAL ROM COMMENTS B LE WFL  -AR     Row Name 11/01/18 1428          MMT (Manual Muscle Testing)    General MMT Comments B LE WFL during mobility   -AR     Row Name 11/01/18 1428          Pain Assessment    Additional Documentation Pain Scale: Numbers Pre/Post-Treatment (Group)  -AR     Row Name  11/01/18 1428          Pain Scale: Numbers Pre/Post-Treatment    Pain Scale: Numbers, Pretreatment 7/10  -AR     Pain Scale: Numbers, Post-Treatment 10/10   during spasms  -AR     Pain Location neck  -AR     Pain Intervention(s) Repositioned;Medication (See MAR)  -AR     Row Name             Wound 10/31/18 1934 Other (See comments) neck incision    Wound - Properties Group Date first assessed: 10/31/18  -KM Time first assessed: 1934  -KM Side: Other (See comments)  -KM Location: neck  -KM Type: incision  -KM    Row Name 11/01/18 1428          Physical Therapy Clinical Impression    Patient/Family Goals Statement (PT Clinical Impression) decrease pain  -AR     Criteria for Skilled Interventions Met (PT Clinical Impression) yes  -AR     Pathology/Pathophysiology Noted (Describe Specifically for Each System) musculoskeletal  -AR     Impairments Found (describe specific impairments) aerobic capacity/endurance;gait, locomotion, and balance  -AR     Rehab Potential (PT Clinical Summary) good, to achieve stated therapy goals  -AR     Row Name 11/01/18 1428          Vital Signs    O2 Delivery Pre Treatment room air  -AR     Row Name 11/01/18 1428          Physical Therapy Goals    Bed Mobility Goal Selection (PT) bed mobility, PT goal 1  -AR     Transfer Goal Selection (PT) transfer, PT goal 1  -AR     Gait Training Goal Selection (PT) gait training, PT goal 1  -AR     Row Name 11/01/18 1428          Bed Mobility Goal 1 (PT)    Activity/Assistive Device (Bed Mobility Goal 1, PT) sit to supine;supine to sit  -AR     [place] Level/Cues Needed (Bed Mobility Goal 1, PT) supervision required  -AR     Time Frame (Bed Mobility Goal 1, PT) 1 week  -AR     Row Name 11/01/18 1428          Transfer Goal 1 (PT)    Activity/Assistive Device (Transfer Goal 1, PT) sit-to-stand/stand-to-sit  -AR     [place] Level/Cues Needed (Transfer Goal 1, PT) standby assist  -AR     Time Frame (Transfer Goal 1, PT) 1 week  -AR     Row Name  11/01/18 1428          Gait Training Goal 1 (PT)    Activity/Assistive Device (Gait Training Goal 1, PT) gait (walking locomotion)  -AR     [place] Level (Gait Training Goal 1, PT) standby assist  -AR     Distance (Gait Goal 1, PT) 150  -AR     Time Frame (Gait Training Goal 1, PT) 1 week  -AR     Row Name 11/01/18 1428          Positioning and Restraints    Pre-Treatment Position in bed  -AR     Post Treatment Position chair  -AR     In Chair notified nsg;reclined;sitting;call light within reach;encouraged to call for assist;with family/caregiver  -AR     Row Name 11/01/18 1428          Living Environment    Home Accessibility stairs to enter home  -AR       User Key  (r) = Recorded By, (t) = Taken By, (c) = Cosigned By    Initials Name Provider Type    Celina Alexander, RN Registered Nurse    Deepa Jimenez, PT Physical Therapist          Physical Therapy Education     Title: PT OT SLP Therapies (Done)     Topic: Physical Therapy (Done)     Point: Mobility training (Done)    Learning Progress Summary     Learner Status Readiness Method Response Comment Documented by    Patient Done Acceptance E [facility] 11/01/18 1436          Point: Home exercise program (Done)    Learning Progress Summary     Learner Status Readiness Method Response Comment Documented by    Patient Done Acceptance E [facility] 11/01/18 1436          Point: Body mechanics (Done)    Learning Progress Summary     Learner Status Readiness Method Response Comment Documented by    Patient Done Acceptance E [facility] 11/01/18 1436          Point: Precautions (Done)    Learning Progress Summary     Learner Status Readiness Method Response Comment Documented by    Patient Done Acceptance E [facility] 11/01/18 1436                      User Key     Initials Effective Dates Name Provider Type [facility] 04/03/18 -  Deepa Fontaine, PT Physical Therapist PT                PT Recommendation and Plan  Anticipated Discharge Disposition (PT): home with  assist (anticipating progress as pn is better under control)  Planned Therapy Interventions (PT Eval): balance training, bed mobility training, gait training, home exercise program, patient/family education, ROM (range of motion), stair training, strengthening, transfer training  Therapy Frequency (PT Clinical Impression): 2 times/day  Outcome Summary/Treatment Plan (PT)  Anticipated Equipment Needs at Discharge (PT):  (no needs anticipated - will update as pt progresses)  Anticipated Discharge Disposition (PT): home with assist (anticipating progress as pn is better under control)  Plan of Care Reviewed With: patient  Outcome Summary: Pt admitted 10/31 s/p C3-7 ACDF.  Increasing pain, declined balance and dropping things prior to surgery; works as a physical therapy assistant.  Pt presents today w/ pain out of control but agreeable to attempt.  Able to take few steps in room w/ contact assist - limited by pain.  Pt demonstrates limited activity tolerance, antalgic gait pattern.  Antiicpating progress for safe DC to home, pending improvement in pain.           Time Calculation:         PT Charges     Row Name 11/01/18 1440 11/01/18 1006          Time Calculation    Start Time 1411  -AR  --     Stop Time 1440  -AR  --     Time Calculation (min) 29 min  -AR  --     PT Received On 11/01/18  -AR  --     PT - Next Appointment 11/02/18  -AR 11/01/18  -AR     PT Goal Re-Cert Due Date 11/08/18  -AR  --       User Key  (r) = Recorded By, (t) = Taken By, (c) = Cosigned By    Initials Name Provider Type    Deepa Jimenez PT Physical Therapist        Therapy Suggested Charges     Code   Minutes Charges    None           Therapy Charges for Today     Code Description Service Date Service Provider Modifiers Qty    62483030985  PT EVAL MOD COMPLEXITY 2 11/1/2018 Deepa Fontaine, PT GP 1    29196794708 HC PT THER PROC EA 15 MIN 11/1/2018 Deepa Fontaine, PT GP 1                 Deepa Fontaine PT  11/1/2018

## 2018-11-01 NOTE — PROGRESS NOTES
Orthopedic Spine Progress Note    Subjective     Post-Operative Day: 1 post-spine procedure C3-7 CERVICAL DISCECTOMY ANTERIOR FUSION  Systemic or Specific Complaints: Pain Control  Patient continues to complain of significant pain traveling into his scapular region and both arms.  He reports significant postoperative muscle spasm. He is tolerating liquids well.  Objective     Vital signs in last 24 hours:  Temp:  [97.5 °F (36.4 °C)-98.1 °F (36.7 °C)] 97.5 °F (36.4 °C)  Heart Rate:  [] 72  Resp:  [16-20] 18  BP: (122-158)/() 122/83    General: alert, appears stated age and cooperative   Neurovascular: Decreased strength noted with left finger abduction and bilateral elbow extension.  I had difficulty assessing his deltoid An bicep strength due to pain with manual muscle testing.however, they seem to be grossly intact.  Sensation intact to light touch.   Wound: Wound clean and dry no evidence of infection.   Range of Motion: limited   DVT Exam: No evidence of DVT seen on physical exam.     Data Review  CBC:  Results from last 7 days  Lab Units 11/01/18  0513 10/30/18  1301   WBC 10*3/mm3  --  4.80   RBC 10*6/mm3  --  5.09   HEMOGLOBIN g/dL 12.8* 15.1   HEMATOCRIT % 41.0 48.4   PLATELETS 10*3/mm3  --  219     Lab Results   Component Value Date    GLUCOSE 103 (H) 11/01/2018    BUN 9 11/01/2018    CREATININE 0.79 11/01/2018    EGFRIFNONA 103 11/01/2018    BCR 11.4 11/01/2018    K 4.1 11/01/2018    CO2 25.9 11/01/2018    CALCIUM 8.5 (L) 11/01/2018    ALBUMIN 4.40 10/30/2018    AST 17 10/30/2018    ALT 19 10/30/2018     TREVER drain output 50 cc overnight  Assessment/Plan     Status post-spine procedure     Pain Relief: no relief    Continues current post-op course  The patient had significant cervical stenosis and apparent inflammation of his spinal cord.  I explained that continued nerve pain and muscle spasm is expected and should improve with time.  We will adjust his medications to make him more comfortable.   His pain medicine was increased to Percocet 5/325 mg  He was started on Valium as needed for muscle spasms.  He will also be started on a Medrol Dosepak.  We'll keep the drain in place for 1 more day.  Continue antibiotics.  Up with physical therapy  Reassess tomorrow.    Activity: out of bed and ambulate    Weight Bearing: FWB     LOS: 1 day     Piotr Steven DO    Date: 11/1/2018

## 2018-11-01 NOTE — PLAN OF CARE
Problem: Patient Care Overview  Goal: Plan of Care Review  Outcome: Ongoing (interventions implemented as appropriate)   11/01/18 0638   Coping/Psychosocial   Plan of Care Reviewed With patient   Plan of Care Review   Progress improving   OTHER   Outcome Summary Pt arrived from PACU in severe pain despite being on pca. Pain med s given several times, ice pack also applied Incision is C/D/I. F/c in place. Will continue to monitor   11/01/18 0638   Coping/Psychosocial   Plan of Care Reviewed With patient   Plan of Care Review   Progress improving   OTHER   Outcome Summary Pt arrived from PACU in severe pain despite being on pca. Pain med s given several times. Incision is C/D/I        Goal: Individualization and Mutuality  Outcome: Ongoing (interventions implemented as appropriate)    Goal: Discharge Needs Assessment  Outcome: Ongoing (interventions implemented as appropriate)    Goal: Interprofessional Rounds/Family Conf  Outcome: Ongoing (interventions implemented as appropriate)      Problem: Fall Risk (Adult)  Goal: Identify Related Risk Factors and Signs and Symptoms  Outcome: Ongoing (interventions implemented as appropriate)    Goal: Absence of Fall  Outcome: Ongoing (interventions implemented as appropriate)      Problem: Pain, Acute (Adult)  Goal: Identify Related Risk Factors and Signs and Symptoms  Outcome: Ongoing (interventions implemented as appropriate)    Goal: Acceptable Pain Control/Comfort Level  Outcome: Outcome(s) achieved Date Met: 11/01/18      Problem: Surgery Nonspecified (Adult)  Goal: Signs and Symptoms of Listed Potential Problems Will be Absent, Minimized or Managed (Surgery Nonspecified)  Outcome: Ongoing (interventions implemented as appropriate)    Goal: Anesthesia/Sedation Recovery  Outcome: Ongoing (interventions implemented as appropriate)

## 2018-11-01 NOTE — PLAN OF CARE
Problem: Patient Care Overview  Goal: Plan of Care Review   11/01/18 9630   Coping/Psychosocial   Plan of Care Reviewed With patient   OTHER   Outcome Summary Pt admitted 10/31 s/p C3-7 ACDF. 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 Able to take few steps in room w/ contact assist - limited by pain. Pt demonstrates limited activity tolerance, antalgic gait pattern. Antiicpating progress for safe DC to home, pending improvement in pain.

## 2018-11-01 NOTE — CONSULTS
Patient Name:  Joey Byrnes  YOB: 1965  MRN:  [id number]  Date of Admission:  10/31/2018  Date of Consult:  11/1/2018  Patient Care Team:  Steffanie Andres APRN as PCP - General (Family Medicine)    Inpatient Hospitalist Consult  Consult performed by: ANGEL RAMIREZ  Consult ordered by: PIOTR MCKENZIE  Reason for consult: Evaluate status and make recommendations regarding treatment for medical problems.          Subjective   History of Present Illness  Mr. Byrnes is a 53 y.o. male that has been admitted to [facility] following elective C3-7 CERVICAL DISCECTOMY ANTERIOR FUSION. He has been admitted to an orthopedic floor following surgery and we were asked to see and assist with his medical problems. At the time of my visit he denies any chest pain, SOA, nausea, vomiting or diarrhea. He has tolerated a diet. He does complain of a lot of postoperative discomfort including severe muscle spasms. He reports being in a normal state of health leading up to surgery.      Past Medical History:   Diagnosis Date   • Anxiety    • Arthritis    • Back pain    • Chronic post-traumatic stress disorder (PTSD)    • Depression    • History of concussion    • History of mononucleosis    • Migraine      Past Surgical History:   Procedure Laterality Date   • ANTERIOR CERVICAL DISCECTOMY W/ FUSION N/A 10/31/2018    Procedure: C3-7 CERVICAL DISCECTOMY ANTERIOR FUSION;  Surgeon: Piotr Mckenzie DO;  [facility] OR;  Service: Orthopedic Spine   • COLONOSCOPY     • INGUINAL HERNIA REPAIR Right    • KNEE ARTHROSCOPY Bilateral     meniscus   • VASECTOMY       Family History   Problem Relation Age of Onset   • Malig Hyperthermia Neg Hx      Social History   Substance Use Topics   • Smoking status: Never Smoker   • Smokeless tobacco: Current User     Types: Snuff      Comment: 1 can weekly   • Alcohol use No     Prescriptions Prior to Admission   Medication Sig Dispense Refill Last Dose   •  HYDROcodone-acetaminophen (NORCO) 7.5-325 MG per tablet Take 1 tablet by mouth Every 6 (Six) Hours As Needed for Moderate Pain .   10/31/2018 at Unknown time   • aspirin 325 MG tablet Take 325 mg by mouth Daily.   10/27/2018     Allergies:  Patient has no known allergies.    Review of Systems   Constitutional: Negative.    HENT: Negative.  Negative for trouble swallowing.    Eyes: Negative.    Respiratory: Negative.    Gastrointestinal: Negative.    Endocrine: Negative.    Genitourinary: Negative.    Musculoskeletal: Negative.    Skin: Negative.    Neurological: Negative.    Hematological: Negative.    Psychiatric/Behavioral: Negative.        Objective      Vital Signs  Temp:  [97.5 °F (36.4 °C)-98.1 °F (36.7 °C)] 97.5 °F (36.4 °C)  Heart Rate:  [] 72  Resp:  [16-20] 18  BP: (122-158)/() 122/83  Body mass index is 27.59 kg/m².    Physical Exam   Constitutional: He is oriented to person, place, and time. He appears well-developed and well-nourished. He appears distressed.   HENT:   Head: Normocephalic and atraumatic.   Eyes: Conjunctivae and EOM are normal. No scleral icterus.   Neck: Normal range of motion. Neck supple. No JVD present.   Surgical drain in place   Cardiovascular: Normal rate and regular rhythm.    No murmur heard.  Pulmonary/Chest: Effort normal. No respiratory distress. He has decreased breath sounds.   Abdominal: Soft. Bowel sounds are normal. He exhibits no distension. There is no tenderness.   Musculoskeletal: He exhibits no edema.   Neurological: He is alert and oriented to person, place, and time. No cranial nerve deficit.   Skin: Skin is warm and dry. He is not diaphoretic.   Psychiatric: He has a normal mood and affect. His behavior is normal.   Vitals reviewed.      Results Review:   I reviewed the patient's new clinical results.  I reviewed the patient's new imaging results and agree with the interpretation.  I reviewed the patient's other test results and agree with the  interpretation  I personally viewed and interpreted the patient's EKG/Telemetry data      Assessment/Plan     Active Hospital Problems    Diagnosis Date Noted   • **Cervical spinal stenosis [M48.02] 10/31/2018   • Chronic post-traumatic stress disorder (PTSD) [F43.12] 11/01/2018   • Arthritis [M19.90] 11/01/2018   • Anxiety [F41.9] 11/01/2018      Resolved Hospital Problems    Diagnosis Date Noted Date Resolved   No resolved problems to display.       Mr. Byrnes is a 53 y.o. male who is POD#1 C3-7 CERVICAL DISCECTOMY ANTERIOR FUSION.    · He seems to be doing okay thus far postoperatively. Surgery is adjusting his medications to treat his pain and muscle spasms. Discussed with nursing staff.  · Anticipate fluctuations in BP due to blood loss, hypovolemia, anesthesia, narcotics etc.   · Continue IVFs as ordered. Monitor renal function.  · SCDs have been ordered for DVT prophylaxis per surgery.  · He was encouraged to use incentive spirometer as instructed.      Thank you very much for asking A to be involved in this patient's care. We will follow along with you.      Joesph Luong MD  [facility]  11/01/18  9:28 AM

## 2018-11-01 NOTE — PROGRESS NOTES
Discharge Planning Assessment  [facility]     Patient Name: Joey Byrnes  MRN: [id number]  Today's Date: 11/1/2018    Admit Date: 10/31/2018          Discharge Needs Assessment     Row Name 11/01/18 1737       Living Environment    Lives With spouse    Name(s) of Who Lives With Patient spouse Ibis Byrnes [phone]    Current Living Arrangements home/apartment/condo    Primary Care Provided by self;spouse/significant other    Provides Primary Care For no one    Family Caregiver if Needed spouse    Family Caregiver Names spouse Ibis Byrnes [phone]    Quality of Family Relationships helpful;involved;supportive    Able to Return to Prior Arrangements yes       Resource/Environmental Concerns    Resource/Environmental Concerns home accessibility    Home Accessibility Concerns stairs to enter home   2 steps with no handrails to enter the single story home    Transportation Concerns car, none       Transition Planning    Patient/Family Anticipates Transition to home with family    Patient/Family Anticipated Services at Transition none    Transportation Anticipated family or friend will provide       Discharge Needs Assessment    Concerns to be Addressed denies needs/concerns at this time    Equipment Currently Used at Home cane, straight    Anticipated Changes Related to Illness none    Equipment Needed After Discharge none    Offered/Gave Vendor List yes    Current Discharge Risk physical impairment            Discharge Plan     Row Name 11/01/18 173       Plan    Plan Plans home with assistance from his wife and childen; currently denies any d/c needs.  Follow to see if any needs arise or patient needs IV antibiotics upon d/c.      Patient/Family in Agreement with Plan yes    Plan Comments Met with the patient and his spouse Ibis Byrnes [phone] at bedside; explained role of CCP, verified facesheet and discussed discharge planning needs.  The patient resides at home with his spouse and 2  children who can assist him at home as needed.  The patient has a cane, has 2 steps to enter her single story home from the front.  [facility] [place]  The patient wants to  his medication from [facility] prior to d/c- Meds to Beds and denies any trouble with remembering to take his medication or with affording his medication.   The patient normally gets his prescriptions filled at the [facility].  The patient denies nay HH/SNF history, was provided with a HH/SNF list and contact number for CCP.  The patient denies any POA documents, denies any d/c needs and states that his wife will transport him home upon d/c. CCP will need to get the cost of the patient's d/c meds prior to Meds to Beds being initiated because if they cost too much the patient will not be able to purchase them.  The patient also stated that he may not need certain pain meds upon d/c as he is already on Hydrocodone at home.  CCP will follow to see if the patient is d/c on any IV antibiotics.  LENY Argueta        Destination     No service coordination in this encounter.      Durable Medical Equipment     No service coordination in this encounter.      Dialysis/Infusion     No service coordination in this encounter.      Home Medical Care     No service coordination in this encounter.      Social Care     No service coordination in this encounter.        Expected Discharge Date and Time     Expected Discharge Date Expected Discharge Time    Nov 1, 2018               Demographic Summary     Row Name 11/01/18 1732       General Information    Admission Type inpatient    Arrived From home    Referral Source physician;nursing    Reason for Consult discharge planning    Preferred Language English     Used During This Interaction no            Functional Status     Row Name 11/01/18 1732       Functional Status    Usual Activity Tolerance excellent    Current Activity Tolerance fair        Functional Status, IADL    Medications assistive equipment    Meal Preparation assistive equipment    Housekeeping assistive equipment    Laundry assistive equipment    Shopping assistive equipment       Mental Status    General Appearance WDL WDL       Mental Status Summary    Recent Changes in Mental Status/Cognitive Functioning no changes            Psychosocial    No documentation.           Abuse/Neglect    No documentation.           Legal    No documentation.           Substance Abuse    No documentation.           Patient Forms     Row Name 11/01/18 7809       Patient Forms    Provider Choice List Delivered    Delivered to Patient    Method of delivery In person          LENY Reevse

## 2018-11-02 PROCEDURE — 25010000003 CEFAZOLIN IN DEXTROSE 2-4 GM/100ML-% SOLUTION: Performed by: ORTHOPAEDIC SURGERY

## 2018-11-02 PROCEDURE — 63710000001 METHYLPREDNISOLONE 4 MG TABLET THERAPY PACK 21 EACH DISP PACK: Performed by: PHYSICIAN ASSISTANT

## 2018-11-02 PROCEDURE — 97116 GAIT TRAINING THERAPY: CPT

## 2018-11-02 RX ADMIN — DIAZEPAM 2 MG: 2 TABLET ORAL at 16:35

## 2018-11-02 RX ADMIN — METHYLPREDNISOLONE 4 MG: 4 TABLET ORAL at 18:16

## 2018-11-02 RX ADMIN — METHYLPREDNISOLONE 4 MG: 4 TABLET ORAL at 16:29

## 2018-11-02 RX ADMIN — DIAZEPAM 2 MG: 2 TABLET ORAL at 21:29

## 2018-11-02 RX ADMIN — OXYCODONE HYDROCHLORIDE AND ACETAMINOPHEN 2 TABLET: 5; 325 TABLET ORAL at 21:29

## 2018-11-02 RX ADMIN — DIAZEPAM 2 MG: 2 TABLET ORAL at 02:33

## 2018-11-02 RX ADMIN — OXYCODONE HYDROCHLORIDE AND ACETAMINOPHEN 2 TABLET: 5; 325 TABLET ORAL at 02:33

## 2018-11-02 RX ADMIN — OXYCODONE HYDROCHLORIDE AND ACETAMINOPHEN 2 TABLET: 5; 325 TABLET ORAL at 12:41

## 2018-11-02 RX ADMIN — CEFAZOLIN SODIUM 2 G: 2 INJECTION, SOLUTION INTRAVENOUS at 18:17

## 2018-11-02 RX ADMIN — SODIUM CHLORIDE 100 ML/HR: 4.5 INJECTION, SOLUTION INTRAVENOUS at 02:06

## 2018-11-02 RX ADMIN — DIAZEPAM 2 MG: 2 TABLET ORAL at 12:41

## 2018-11-02 RX ADMIN — CYCLOBENZAPRINE 10 MG: 10 TABLET, FILM COATED ORAL at 18:17

## 2018-11-02 RX ADMIN — SODIUM CHLORIDE 100 ML/HR: 4.5 INJECTION, SOLUTION INTRAVENOUS at 12:42

## 2018-11-02 RX ADMIN — METHYLPREDNISOLONE 8 MG: 4 TABLET ORAL at 21:29

## 2018-11-02 RX ADMIN — OXYCODONE HYDROCHLORIDE AND ACETAMINOPHEN 2 TABLET: 5; 325 TABLET ORAL at 08:11

## 2018-11-02 RX ADMIN — CYCLOBENZAPRINE 10 MG: 10 TABLET, FILM COATED ORAL at 06:32

## 2018-11-02 RX ADMIN — METHYLPREDNISOLONE 4 MG: 4 TABLET ORAL at 06:34

## 2018-11-02 RX ADMIN — CEFAZOLIN SODIUM 2 G: 2 INJECTION, SOLUTION INTRAVENOUS at 02:08

## 2018-11-02 RX ADMIN — CEFAZOLIN SODIUM 2 G: 2 INJECTION, SOLUTION INTRAVENOUS at 10:28

## 2018-11-02 RX ADMIN — METHYLPREDNISOLONE 8 MG: 4 TABLET ORAL at 06:33

## 2018-11-02 RX ADMIN — OXYCODONE HYDROCHLORIDE AND ACETAMINOPHEN 2 TABLET: 5; 325 TABLET ORAL at 16:35

## 2018-11-02 RX ADMIN — DIAZEPAM 2 MG: 2 TABLET ORAL at 08:12

## 2018-11-02 RX ADMIN — HYDROMORPHONE HYDROCHLORIDE: 10 INJECTION INTRAMUSCULAR; INTRAVENOUS; SUBCUTANEOUS at 12:24

## 2018-11-02 NOTE — PLAN OF CARE
Problem: Patient Care Overview  Goal: Plan of Care Review  Outcome: Ongoing (interventions implemented as appropriate)   11/02/18 1201   Coping/Psychosocial   Plan of Care Reviewed With patient   Plan of Care Review   Progress improving   OTHER   Outcome Summary Pt tolerated treatment with minimal c/o pain. Pt ambulated 400 feet, CGA with No AD. Pt a little unsteady at times but no overt LOB.

## 2018-11-02 NOTE — THERAPY TREATMENT NOTE
Acute Care - Physical Therapy Treatment Note  [facility]     Patient Name: Joey Byrnes  : 1965  MRN: [id number]  Today's Date: 2018  Onset of Illness/Injury or Date of Surgery: 10/31/18          Admit Date: 10/31/2018    Visit Dx:  No diagnosis found.  Patient Active Problem List   Diagnosis   • Cervical spinal stenosis   • Chronic post-traumatic stress disorder (PTSD)   • Arthritis   • Anxiety       Therapy Treatment          Rehabilitation Treatment Summary     Row Name 18 1438 18 1054          Treatment Time/Intention    Discipline physical therapy assistant  - physical therapy assistant  -     Document Type therapy note (daily note)  - therapy note (daily note)  -     Subjective Information no complaints  - complains of;pain  -     Mode of Treatment physical therapy  - physical therapy  -     Patient/Family Observations Pt hinton position in be with spouse present in room   - Pt flowers position in bed with spouse present in room   -     Care Plan Review patient/other agree to care plan  - patient/other agree to care plan  -     Therapy Frequency (PT Clinical Impression) 2 times/day  - 2 times/day  -     Patient Effort good  -EH good  -     Existing Precautions/Restrictions fall;brace worn when out of bed;spinal  -EH fall;brace worn when out of bed;spinal  -EH     Recorded by [] Sunshine Randolph, [facility] 18 1511 [] Sunshine Randolph, [facility] 18 1201     Row Name 18 1438 18 1054          Cognitive Assessment/Intervention- PT/OT    Orientation Status (Cognition) oriented x 3  -EH oriented x 3  -EH     Follows Commands (Cognition) WNL  -EH WNL  -EH     Recorded by [] Sunshine Randolph, [facility] 18 1511 [] Sunshine Randolph, [facility] 18 1201     Row Name 18 1438 18 1054          Bed Mobility Assessment/Treatment    Bed Mobility Assessment/Treatment  -- --  -EH     Supine-Sit [place] (Bed Mobility) contact guard  - contact guard   -EH     Sit-Supine Daggett (Bed Mobility) contact guard  -EH contact guard  -EH     Assistive Device (Bed Mobility) bed rails;head of bed elevated  -EH bed rails;head of bed elevated  -EH     Recorded by [] Sunshine Randolph, PTA 11/02/18 1511 [EH] Sunshine Randolph, PTA 11/02/18 1201     Row Name 11/02/18 1054             Transfer Assessment/Treatment    Transfer Assessment/Treatment --  -EH      Recorded by [] Sunshine Randolph, PTA 11/02/18 1201      Row Name 11/02/18 1438 11/02/18 1054          Sit-Stand Transfer    Sit-Stand Daggett (Transfers) contact guard  -EH contact guard  -EH     Assistive Device (Sit-Stand Transfers) walker, front-wheeled  -EH walker, front-wheeled  -EH     Recorded by [EH] Sunshine Randolph, PTA 11/02/18 1511 [EH] Sunshine Randolph, PTA 11/02/18 1201     Row Name 11/02/18 1438 11/02/18 1054          Stand-Sit Transfer    Stand-Sit [place]gett (Transfers) contact guard  -EH contact guard  -EH     Assistive Device (Stand-Sit Transfers) walker, front-wheeled  -EH walker, front-wheeled  -EH     Recorded by [] Sunshine Randolph, PTA 11/02/18 1511 [EH] Sunshine Randolph, PTA 11/02/18 1201     Row Name 11/02/18 1438 11/02/18 1054          Gait/Stairs Assessment/Training    Daggett Level (Gait) contact guard  -EH contact guard  -EH     Assistive Device (Gait) --   No AD  -EH --   No AD  -EH2     Distance in Feet (Gait) 400  -  -EH     Pattern (Gait) swing-through  -EH swing-through  -EH     Deviations/Abnormal Patterns (Gait) antalgic  -EH antalgic  -EH     Comment (Gait/Stairs) No unsteadiness this afternoon  -EH Pt a little unsteady at times but no overt LOB  -EH2     Recorded by [EH] Sunshine Randolph, PTA 11/02/18 1511 [EH] Sunshine Randolph, PTA 11/02/18 1201  [EH2] Sunshine Randolph, PTA 11/02/18 1204     Row Name 11/02/18 1438 11/02/18 1054          Positioning and Restraints    Pre-Treatment Position in bed  -EH in bed  -EH     Post Treatment Position bed  -EH bed  -EH     In Bed fowlers;call light  within reach;encouraged to call for assist;with family/caregiver  - fowlers;call light within reach;encouraged to call for assist;with family/caregiver  -     Recorded by [] SkipSunshine weinstein, PTA 11/02/18 1511 [] Sunshine Randolph, PTA 11/02/18 1201     Row Name 11/02/18 1054             Pain Scale: Numbers Pre/Post-Treatment    Pain Scale: Numbers, Pretreatment --  -      Pain Scale: Numbers, Post-Treatment --  -      Pain Location --  -EH      Recorded by [] Sunshine Randolph, PTA 11/02/18 1201      Row Name                Wound 10/31/18 1934 Other (See comments) neck incision    Wound - Properties Group Date first assessed: 10/31/18 [KM] Time first assessed: 1934 [KM] Side: Other (See comments) [KM] Location: neck [KM] Type: incision [KM] Recorded by:  [] Celina Akhtar RN 10/31/18 1934    Row Name 11/02/18 1438 11/02/18 1054          Outcome Summary/Treatment Plan (PT)    Anticipated Equipment Needs at Discharge (PT) --   no needs anticipated - will update as pt progresses  - --   no needs anticipated - will update as pt progresses  -     Anticipated Discharge Disposition (PT) home with assist   anticipating progress as pn is better under control  - home with assist   anticipating progress as pn is better under control  -     Recorded by [] Sunshine Randolph, PTA 11/02/18 1511 [] Sunshine Randolph, PTA 11/02/18 1201       User Key  (r) = Recorded By, (t) = Taken By, (c) = Cosigned By    Initials Name Effective Dates Discipline    KM Celina Akhtar RN 11/27/17 -  Nurse     Tiffanie Randolphe, PTA 08/19/18 -  PT          Wound 10/31/18 1934 Other (See comments) neck incision (Active)   Dressing Appearance dry;intact 11/2/2018 10:30 AM   Closure Liquid skin adhesive 11/2/2018 10:30 AM   Base clean;dry 11/2/2018 10:30 AM   Periwound pink 11/2/2018 10:30 AM   Periwound Temperature warm 11/2/2018 10:30 AM   Edges rolled/closed 11/2/2018 10:30 AM   Drainage Amount none 11/2/2018  8:11 AM   Dressing Care,  Wound dressing changed 11/2/2018 10:30 AM   Sutures Removed Intact Yes 11/2/2018 10:30 AM   Number of Sutures Removed 1 11/2/2018 10:30 AM             Physical Therapy Education     Title: PT OT SLP Therapies (Done)     Topic: Physical Therapy (Done)     Point: Mobility training (Done)    Learning Progress Summary     Learner Status Readiness Method Response Comment Documented by    Patient Done Acceptance E JULISA,[facility] 11/02/18 1203     Done Acceptance E VU  AR 11/01/18 1436          Point: Home exercise program (Done)    Learning Progress Summary     Learner Status Readiness Method Response Comment Documented by    Patient Done Acceptance E VU,[facility] 11/02/18 1203     Done Acceptance E VU  AR 11/01/18 1436          Point: Body mechanics (Done)    Learning Progress Summary     Learner Status Readiness Method Response Comment Documented by    Patient Done Acceptance E VU,[facility] 11/02/18 1203     Done Acceptance E VU  AR 11/01/18 1436          Point: Precautions (Done)    Learning Progress Summary     Learner Status Readiness Method Response Comment Documented by    Patient Done Acceptance E JULISA,[facility] 11/02/18 1203     Done Acceptance E VU  AR 11/01/18 1436                      User Key     Initials Effective Dates Name Provider Type Discipline    AR 04/03/18 -  Deepa Fontaine, PT Physical Therapist PT     08/19/18 -  Sunshine Randolph PTA Physical Therapy Assistant PT                    PT Recommendation and Plan  Anticipated Discharge Disposition (PT): home with assist (anticipating progress as pn is better under control)  Therapy Frequency (PT Clinical Impression): 2 times/day  Outcome Summary/Treatment Plan (PT)  Anticipated Equipment Needs at Discharge (PT):  (no needs anticipated - will update as pt progresses)  Anticipated Discharge Disposition (PT): home with assist (anticipating progress as pn is better under control)  Plan of Care Reviewed With: patient  Progress: improving  Outcome Summary: Pt  tolerated  treatment with minimal c/o pain. Pt ambulated 400 feet, CGA with No AD. Pt a little unsteady at times but no overt LOB.           Outcome Measures     Row Name 11/02/18 1200             How much help from another person do you currently need...    Turning from your back to your side while in flat bed without using bedrails? 3  -EH      Moving from lying on back to sitting on the side of a flat bed without bedrails? 3  -EH      Moving to and from a bed to a chair (including a wheelchair)? 3  -EH      Standing up from a chair using your arms (e.g., wheelchair, bedside chair)? 3  -EH      Climbing 3-5 steps with a railing? 3  -EH      To walk in hospital room? 3  -EH      AM-PAC 6 Clicks Score 18  -EH         Functional Assessment    Outcome Measure Options AM-PAC 6 Clicks Basic Mobility (PT)  -        User Key  (r) = Recorded By, (t) = Taken By, (c) = Cosigned By    Initials Name Provider Type     Sunshine Randolph PTA Physical Therapy Assistant           Time Calculation:         PT Charges     Row Name 11/02/18 1511 11/02/18 1205          Time Calculation    Start Time 1438  -EH 1054  -EH     Stop Time 1447  -EH 1111  -EH     Time Calculation (min) 9 min  -EH 17 min  -EH     PT Received On 11/02/18  - 11/02/18  -     PT - Next Appointment 11/03/18  - 11/02/18  -        Time Calculation- PT    Total Timed Code Minutes- PT 9 minute(s)  -EH 17 minute(s)  -       User Key  (r) = Recorded By, (t) = Taken By, (c) = Cosigned By    Initials Name Provider Type     Sunshine Randolph PTA Physical Therapy Assistant        Therapy Suggested Charges     Code   Minutes Charges    None           Therapy Charges for Today     Code Description Service Date Service Provider Modifiers Qty    19061967348 HC GAIT TRAINING EA 15 MIN 11/2/2018 Sunshine Randolph PTA GP 1    98980355375 HC GAIT TRAINING EA 15 MIN 11/2/2018 Sunshine Randolph PTA GP 1          PT G-Codes  Outcome Measure Options: AM-PAC 6 Clicks Basic Mobility (PT)  AM-PAC  6 Clicks Score: 18    Sunshine Randolph, PTA  11/2/2018

## 2018-11-02 NOTE — PROGRESS NOTES
Orthopedic Spine Progress Note    Subjective     Post-Operative Day: 2 post-spine procedure C3-7 CERVICAL DISCECTOMY ANTERIOR FUSION  Systemic or Specific Complaints: Pain Control. Muscle spasms in the neck and arms.    Objective     Vital signs in last 24 hours:  Temp:  [97.6 °F (36.4 °C)-98 °F (36.7 °C)] 98 °F (36.7 °C)  Heart Rate:  [63-89] 89  Resp:  [18] 18  BP: (114-125)/(72-84) 114/78    General: alert, appears stated age and cooperative   Neurovascular: stable   Wound: Wound clean and dry no evidence of infection.   Range of Motion: limited   DVT Exam: No evidence of DVT seen on physical exam.     Data Review  CBC:    Results from last 7 days  Lab Units 11/01/18  0513 10/30/18  1301   WBC 10*3/mm3  --  4.80   RBC 10*6/mm3  --  5.09   HEMOGLOBIN g/dL 12.8* 15.1   HEMATOCRIT % 41.0 48.4   PLATELETS 10*3/mm3  --  219     Lab Results   Component Value Date    GLUCOSE 103 (H) 11/01/2018    BUN 9 11/01/2018    CREATININE 0.79 11/01/2018    EGFRIFNONA 103 11/01/2018    BCR 11.4 11/01/2018    K 4.1 11/01/2018    CO2 25.9 11/01/2018    CALCIUM 8.5 (L) 11/01/2018    ALBUMIN 4.40 10/30/2018    AST 17 10/30/2018    ALT 19 10/30/2018     TREVER drain output 30 cc over 24 hoursnight  Assessment/Plan     Status post-spine procedure   Continue pain control with PCA prn, percocet, valium and medrol dose pack    Pain Relief: some relief    Continues current post-op course  D/c drain  Up with physical therapy  Possible d/c to home tomorrow      Activity: out of bed and ambulate    Weight Bearing: FWB     LOS: 2 days     Piotr Steven DO    Date: 11/2/2018

## 2018-11-02 NOTE — BRIEF OP NOTE
CERVICAL DISCECTOMY ANTERIOR FUSION WITH INSTRUMENTATION  Progress Note    Joey Byrnes  10/31/2018 - 11/1/2018    Pre-op Diagnosis:   CS C3-7  Myelopathy       Post-Op Diagnosis Codes:   same    Procedure/CPT® Codes:      Procedure(s):  C3-7 CERVICAL DISCECTOMY ANTERIOR FUSION    Surgeon(s):  Piotr Steven DO    Anesthesia: General    Staff:   Circulator: Celina Akhtar RN; Karey Longo RN  Radiology Technologist: Marianela Spencer RRT  Scrub Person: Rosaline Martinez  Assistant: Wendy Gao PA    Estimated Blood Loss: 200 mL    Urine Voided: * No values recorded between 10/31/2018  4:34 PM and 10/31/2018  7:50 PM *    Specimens:                None      Drains:   Closed/Suction Drain Right Neck Bulb (Active)   Site Description Unable to view 11/2/2018  8:11 AM   Dressing Status Clean;Dry;Intact 11/2/2018  8:11 AM   Drainage Appearance Serosanguineous 11/2/2018  8:11 AM   Status To bulb suction 11/2/2018  8:11 AM   Output (mL) 20 mL 11/2/2018  5:23 AM       [REMOVED] Urethral Catheter Silicone 16 Fr. (Removed)   Daily Indications < 24 hr post op 11/1/2018  9:43 AM   Site Assessment Skin intact;Clean 11/1/2018  9:43 AM   Collection Container Standard drainage bag 11/1/2018  9:43 AM   Securement Method Securing device 11/1/2018  9:43 AM   Output (mL) 350 mL 11/1/2018  6:00 AM       [REMOVED] Urethral Catheter (Removed)       Findings: Severe spinal stenosis      Complications: None apparent        Piotr Steven DO     Date: 11/2/2018  Time: 10:30 AM

## 2018-11-02 NOTE — PLAN OF CARE
Problem: Patient Care Overview  Goal: Plan of Care Review  Outcome: Ongoing (interventions implemented as appropriate)   11/02/18 8016   Coping/Psychosocial   Plan of Care Reviewed With patient   Plan of Care Review   Progress improving   OTHER   Outcome Summary Pt is A/Ox4, VSS. C/o pain to to neck and elisabet shoulders. Pain control better with alternating Percocet, Flexeril, and Valium. PCA dilaudid continued, but pt decreasing use. Amb to br with assist x1 without diff. Pt wanting to be d/c to home today. Will continue to monitor       Problem: Pain, Acute (Adult)  Goal: Identify Related Risk Factors and Signs and Symptoms  Outcome: Ongoing (interventions implemented as appropriate)

## 2018-11-02 NOTE — PROGRESS NOTES
Name: Joey Byrnes ADMIT: 10/31/2018   : 1965  PCP: Steffanie Andres APRN    MRN: [id number] LOS: 2 days   AGE/SEX: 53 y.o. male  ROOM: [facility]     Subjective   Doing much better.    Objective   Vital Signs  Temp:  [97.6 °F (36.4 °C)-98 °F (36.7 °C)] 98 °F (36.7 °C)  Heart Rate:  [63-89] 89  Resp:  [18] 18  BP: (114-125)/(72-84) 114/78  SpO2:  [94 %-95 %] 94 %  on   ;   Device (Oxygen Therapy): room air  Body mass index is 27.59 kg/m².    Physical Exam   Constitutional: He is oriented to person, place, and time. No distress.   Cardiovascular: Normal rate and regular rhythm.    No murmur heard.  Pulmonary/Chest: Effort normal and breath sounds normal.   Abdominal: Soft. Bowel sounds are normal. He exhibits no distension. There is no tenderness.   Musculoskeletal: Normal range of motion. He exhibits no edema.   Neurological: He is alert and oriented to person, place, and time.   Skin: Skin is warm and dry. He is not diaphoretic.       Results Review:       I reviewed the patient's new clinical results.    Results from last 7 days  Lab Units 11/01/18  0513 10/30/18  1301   WBC 10*3/mm3  --  4.80   HEMOGLOBIN g/dL 12.8* 15.1   PLATELETS 10*3/mm3  --  219     Results from last 7 days  Lab Units 11/01/18  0513 10/30/18  1301   SODIUM mmol/L 136 139   POTASSIUM mmol/L 4.1 4.5   CHLORIDE mmol/L 100 102   CO2 mmol/L 25.9 26.6   BUN mg/dL 9 14   CREATININE mg/dL 0.79 0.85   GLUCOSE mg/dL 103* 101*   Estimated Creatinine Clearance: 110.8 mL/min (by C-G formula based on SCr of 0.79 mg/dL).  Results from last 7 days  Lab Units 11/01/18  0513 10/30/18  1301   CALCIUM mg/dL 8.5* 9.4   ALBUMIN g/dL  --  4.40     No results found for: [id number] results found for: POCGLU      ceFAZolin 2 g Intravenous Q8H   MethylPREDNISolone 4 mg Oral TID Around Food   [START ON 11/3/2018] MethylPREDNISolone 4 mg Oral 4x Daily Taper   [START ON 2018] MethylPREDNISolone 4 mg Oral TID Around Food   [START ON 2018]  MethylPREDNISolone 4 mg Oral BID   [START ON 11/6/2018] MethylPREDNISolone 4 mg Oral Before Breakfast   MethylPREDNISolone 8 mg Oral Tonight   sodium chloride 3 mL Intravenous Q12H       HYDROmorphone HCl-NaCl     lactated ringers 9 mL/hr Last Rate: 9 mL/hr (10/31/18 1318)   sodium chloride 100 mL/hr Last Rate: 100 mL/hr (11/02/18 1242)         Assessment/Plan      Active Hospital Problems    Diagnosis Date Noted   • **Cervical spinal stenosis [M48.02] 10/31/2018   • Chronic post-traumatic stress disorder (PTSD) [F43.12] 11/01/2018   • Arthritis [M19.90] 11/01/2018   • Anxiety [F41.9] 11/01/2018      Resolved Hospital Problems    Diagnosis Date Noted Date Resolved   No resolved problems to display.       Mr. Byrnes is a 53 y.o. male who is POD#2 C3-7 CERVICAL DISCECTOMY ANTERIOR FUSION.    · Doing well postoperatively.  · Okay with me to discharge.      Joesph Luong MD  [facility]  11/02/18  1:22 PM

## 2018-11-02 NOTE — THERAPY TREATMENT NOTE
Acute Care - Physical Therapy Treatment Note  [facility]     Patient Name: Joey Byrnes  : 1965  MRN: [id number]  Today's Date: 2018  Onset of Illness/Injury or Date of Surgery: 10/31/18          Admit Date: 10/31/2018    Visit Dx:  No diagnosis found.  Patient Active Problem List   Diagnosis   • Cervical spinal stenosis   • Chronic post-traumatic stress disorder (PTSD)   • Arthritis   • Anxiety       Therapy Treatment          Rehabilitation Treatment Summary     Row Name 18 1054             Treatment Time/Intention    Discipline physical therapy assistant  -      Document Type therapy note (daily note)  -      Subjective Information complains of;pain  -      Mode of Treatment physical therapy  -      Patient/Family Observations Pt hinton position in bed with spouse present in room   -      Care Plan Review patient/other agree to care plan  -      Therapy Frequency (PT Clinical Impression) 2 times/day  -      Patient Effort good  -      Existing Precautions/Restrictions fall;brace worn when out of bed;spinal  -EH      Recorded by [] Sunshine Randolph PTA 18 1201      Row Name 18 1054             Cognitive Assessment/Intervention- PT/OT    Orientation Status (Cognition) oriented x 3  -EH      Follows Commands (Cognition) WNL  -EH      Recorded by [] Sunshine Randolph PTA 18 1201      Row Name 18 1054             Bed Mobility Assessment/Treatment    Bed Mobility Assessment/Treatment --  -EH      Supine-Sit [place] (Bed Mobility) contact guard  -EH      Sit-Supine [place] (Bed Mobility) contact guard  -EH      Assistive Device (Bed Mobility) bed rails;head of bed elevated  -EH      Recorded by [] Sunshine Randolph PTA 18 1201      Row Name 18 1054             Transfer Assessment/Treatment    Transfer Assessment/Treatment --  -EH      Recorded by [] Sunshine Randolph PTA 18 1201      Row Name 18 1054             Sit-Stand  Transfer    Sit-Stand [place] (Transfers) contact guard  -EH      Assistive Device (Sit-Stand Transfers) walker, front-wheeled  -EH      Recorded by [EH] Sunshine Randolph, PTA 11/02/18 1201      Row Name 11/02/18 1054             Stand-Sit Transfer    Stand-Sit [place] (Transfers) contact guard  -EH      Assistive Device (Stand-Sit Transfers) walker, front-wheeled  -EH      Recorded by [EH] Sunshine Randolph, PTA 11/02/18 1201      Row Name 11/02/18 1054             Gait/Stairs Assessment/Training    [place] Level (Gait) contact guard  -EH      Assistive Device (Gait) --   No AD  -EH2      Distance in Feet (Gait) 400  -EH      Pattern (Gait) swing-through  -EH      Deviations/Abnormal Patterns (Gait) antalgic  -EH      Comment (Gait/Stairs) Pt a little unsteady at times but no overt LOB  -EH2      Recorded by [EH] Sunshine Randolph, PTA 11/02/18 1201  [EH2] Sunshine Randolph, [facility] 11/02/18 1204      Row Name 11/02/18 1054             Positioning and Restraints    Pre-Treatment Position in bed  -EH      Post Treatment Position bed  -EH      In Bed fowlers;call light within reach;encouraged to call for assist;with family/caregiver  -EH      Recorded by [EH] Sunshine Randolph, PTA 11/02/18 1201      Row Name 11/02/18 1054             Pain Scale: Numbers Pre/Post-Treatment    Pain Scale: Numbers, Pretreatment --  -      Pain Scale: Numbers, Post-Treatment --  -      Pain Location --  -EH      Recorded by [] Sunshine Randolph, PTA 11/02/18 1201      Row Name                Wound 10/31/18 1934 Other (See comments) neck incision    Wound - Properties Group Date first assessed: 10/31/18 [KM] Time first assessed: 1934 [KM] Side: Other (See comments) [KM] Location: neck [KM] Type: incision [KM] Recorded by:  [KM] Celina Akhtar, RN 10/31/18 1934    Row Name 11/02/18 1054             Outcome Summary/Treatment Plan (PT)    Anticipated Equipment Needs at Discharge (PT) --   no needs anticipated - will update as pt progresses  -       Anticipated Discharge Disposition (PT) home with assist   anticipating progress as pn is better under control  -EH      Recorded by [] Sunshine Randolph, PTA 11/02/18 1201        User Key  (r) = Recorded By, (t) = Taken By, (c) = Cosigned By    Initials Name Effective Dates Discipline    KM Celina Akhtar, RN 11/27/17 -  Nurse     Sunshine Randolph, PTA 08/19/18 -  PT          Wound 10/31/18 1934 Other (See comments) neck incision (Active)   Dressing Appearance dry;intact 11/2/2018 10:30 AM   Closure Liquid skin adhesive 11/2/2018 10:30 AM   Base clean;dry 11/2/2018 10:30 AM   Periwound pink 11/2/2018 10:30 AM   Periwound Temperature warm 11/2/2018 10:30 AM   Edges rolled/closed 11/2/2018 10:30 AM   Drainage Amount none 11/2/2018  8:11 AM   Dressing Care, Wound dressing changed 11/2/2018 10:30 AM   Sutures Removed Intact Yes 11/2/2018 10:30 AM   Number of Sutures Removed 1 11/2/2018 10:30 AM             Physical Therapy Education     Title: PT OT SLP Therapies (Done)     Topic: Physical Therapy (Done)     Point: Mobility training (Done)    Learning Progress Summary     Learner Status Readiness Method Response Comment Documented by    Patient Done Acceptance E VU,DU   11/02/18 1203     Done Acceptance E VU  AR 11/01/18 1436          Point: Home exercise program (Done)    Learning Progress Summary     Learner Status Readiness Method Response Comment Documented by    Patient Done Acceptance E VU,DU  EH 11/02/18 1203     Done Acceptance E VU  AR 11/01/18 1436          Point: Body mechanics (Done)    Learning Progress Summary     Learner Status Readiness Method Response Comment Documented by    Patient Done Acceptance E VU,DU  EH 11/02/18 1203     Done Acceptance E VU  AR 11/01/18 1436          Point: Precautions (Done)    Learning Progress Summary     Learner Status Readiness Method Response Comment Documented by    Patient Done Acceptance E VU,DU   11/02/18 1203     Done Acceptance E VU  AR 11/01/18 1436                       User Key     Initials Effective Dates Name Provider Type Discipline    AR 04/03/18 -  Deepa Fontaine, PT Physical Therapist PT     08/19/18 -  Sunshine Randolph PTA Physical Therapy Assistant PT                    PT Recommendation and Plan  Anticipated Discharge Disposition (PT): home with assist (anticipating progress as pn is better under control)  Therapy Frequency (PT Clinical Impression): 2 times/day  Outcome Summary/Treatment Plan (PT)  Anticipated Equipment Needs at Discharge (PT):  (no needs anticipated - will update as pt progresses)  Anticipated Discharge Disposition (PT): home with assist (anticipating progress as pn is better under control)  Plan of Care Reviewed With: patient  Progress: improving  Outcome Summary: Pt  tolerated treatment with minimal c/o pain. Pt ambulated 400 feet, CGA with No AD. Pt a little unsteady at times but no overt LOB.           Outcome Measures     Row Name 11/02/18 1200             How much help from another person do you currently need...    Turning from your back to your side while in flat bed without using bedrails? 3  -EH      Moving from lying on back to sitting on the side of a flat bed without bedrails? 3  -EH      Moving to and from a bed to a chair (including a wheelchair)? 3  -EH      Standing up from a chair using your arms (e.g., wheelchair, bedside chair)? 3  -EH      Climbing 3-5 steps with a railing? 3  -EH      To walk in hospital room? 3  -EH      AM-PAC 6 Clicks Score 18  -EH         Functional Assessment    Outcome Measure Options AM-PAC 6 Clicks Basic Mobility (PT)  -EH        User Key  (r) = Recorded By, (t) = Taken By, (c) = Cosigned By    Initials Name Provider Type     Sunshine Randolph PTA Physical Therapy Assistant           Time Calculation:         PT Charges     Row Name 11/02/18 1205             Time Calculation    Start Time 1054  -      Stop Time 1111  -EH      Time Calculation (min) 17 min  -EH      PT Received On 11/02/18   -      PT - Next Appointment 11/02/18  -         Time Calculation- PT    Total Timed Code Minutes- PT 17 minute(s)  -        User Key  (r) = Recorded By, (t) = Taken By, (c) = Cosigned By    Initials Name Provider Type     Sunshine Randolph PTA Physical Therapy Assistant        Therapy Suggested Charges     Code   Minutes Charges    None           Therapy Charges for Today     Code Description Service Date Service Provider Modifiers Qty    [id number] HC GAIT TRAINING EA 15 MIN 11/2/2018 Sunshine Randolph PTA GP 1          PT G-Codes  Outcome Measure Options: AM-PAC 6 Clicks Basic Mobility (PT)  AM-PAC 6 Clicks Score: 18    Sunshine Randolph PTA  11/2/2018

## 2018-11-02 NOTE — PLAN OF CARE
Problem: Patient Care Overview  Goal: Plan of Care Review  Outcome: Ongoing (interventions implemented as appropriate)   11/01/18 1958   Coping/Psychosocial   Plan of Care Reviewed With spouse;patient   Plan of Care Review   Progress improving   OTHER   Outcome Summary AOx3, VSS, pain became more managed and tolerable throughout shift. Pt experienced significant muscle spasms to BUE and neck during the day before meds were adjusted. Pt reports valium helped his spasms greatly. Drsg to neck clean, dry, intact. TREVER drain put out serosanguinous drainage. Up to bathroom and to chair multiple times today. Oneil cath removed and pt already voided. Will continue to monitor.        Problem: Fall Risk (Adult)  Goal: Identify Related Risk Factors and Signs and Symptoms  Outcome: Ongoing (interventions implemented as appropriate)    Goal: Absence of Fall  Outcome: Ongoing (interventions implemented as appropriate)      Problem: Pain, Acute (Adult)  Goal: Identify Related Risk Factors and Signs and Symptoms  Outcome: Ongoing (interventions implemented as appropriate)

## 2018-11-02 NOTE — OP NOTE
Pre-op Diagnosis:   Cervical stenosis C3 to 7  Cervical myelopathy  Post-Op Diagnosis Codes:  Same    Procedure:  1.  Anterior cervical arthrodesis including removal of the disc for decompression of the spinal cord and spinal nerve roots C3-C4 07266  2.  Anterior cervical arthrodesis including removal of the disc for decompression of spinal cord and spinal nerve roots C4-C5 50483  3.  Anterior cervical arthrodesis including removal of the disc for decompression of the spinal cord and spinal nerve roots C5-C6 65973  4.  Anterior cervical arthrodesis including removal of the disc for decompression of the spinal cord and spinal nerve roots C6-C7 91360  5.  Anterior cervical instrumentation C3, C4, C5, C6, and C7 33180  6.  Application of biomechanical intervertebral device C3-C4 88304  7.  Application of biomechanical intervertebral device C4-C5 93717  8.  Application of biomechanical intervertebral device C5-C6 97247  9.  Application of mechanical intervertebral device C6-C7 28434    Surgeon:Piotr Steven DO    Assistant:Wendy Gao PA-C Please note I utilized the services of an assistant, specifically, Wendy Gao PA-C.  Wendy participated in crucial portions of the operation.  The use of Wendy greatly reduced overall operative time thereby reducing overall morbidity for the patient.  Specifically, Wendy provided suctioning hemostasis and retraction for safe decompression of the cervical spine.    Anesthesia:General  Estimated Blood Loss: 100 mL  Specimens: * No orders in the log *  Complications: None apparent  Implant: Alphatec spine  Disposition: Patient recovery in stable condition    Preoperative indications: Patient's a 53 y.o.who is been having severe neck pain with symptoms of myelopathy due to severe cervical stenosis.  He tried and failed extensive conservative care.  He was developing balance disturbance and weakness in his arms.  I reviewed his imaging which showed severe cervical stenosis from C3 to  7.  His cord was being compressed at C3-C4.  I recommended ACDF from C3 7.  The risks of surgery were described to the patient including but not limited to; bleeding, infection, blood clots, pulmonary embolus, heart attack, stroke, risk of recurrent laryngeal nerve palsy causing voice disturbance, risk of postoperative dysphasia either temporary or permanent, nerve damage or spinal cord injury causing complete or partial paralysis, risk of dural tear causing spinal headache, nonunion, hardware complication, need for further surgery, lack of guarantee, continued pain, continued weakness, and continued numbness.  The patient had many questions about these risks follow which are answered to the best of my ability in layman's terms.  Informed consent obtained.      Procedure in detail: The patient was identified in the preoperative holding area.  All labs and consent following order.  DELROY hose and SCDs were applied for thromboembolic prophylaxis.  Her neck was marked with an indelible marker.  She was transferred to the operative suite where she was given the benefit of general anesthesia 2 g of Kefzol.  Oneil catheter was placed as well as neuro monitoring leads.  She was positioned supine with  arms tucked to the side.  Shoulder roll was placed in her neck was prepped and draped in the usual sterile fashion.  Timeout was performed followed by standard Martinez-Moulton incision to the anterior neck and existing skin crease.  Bovie dissection as well as scissor dissection was performed to the platysma and anterior cervical fascia down the prevertebral fascia.  The trachea esophagus and carotid sheath were retracted while I incised the prevertebral fascia with the Bovie.  A marking device is placed and was confirmed to be in the C3 vertebra with fluoroscopy.  I then elevated the longus coli bilaterally from C3 to 7.  I removed the marking device and performed discectomy at C3-C4.  I used curettes and pituitary to remove  cartilaginous endplate as well as degenerative disc.  I used a lamina  to access the posterior annulus and posterior longitudinal ligament which was removed.  Kerrison punch was used to remove osteophytes from the uncovertebral joint.  Full decompression was performed of the spinal cord and the exiting C5 nerve roots.  I then irrigated and achieved hemostasis.  I completed the same surgery at the remaining 3 levels.  After decompressing the bilateral C5, C6 and C7 nerve roots I trialed and placed peek spacers at all 4 levels. The spacers were filled with autograft bone as well as allograft bone.  64 mm Alphatec trestle lux plate was then affixed to the vertebra with 10 cortical screws.  Final x-rays are showing good placement of the hardware.  Final irrigation and hemostasis was obtained.  A TREVER drain was placed followed by layered closure of the wound.  Sterile dressings were applied.  Patient was awakened general anesthesia, transferred to the hospital bed, and taken to the recovery room in stable condition.    Disposition: The patient will be admitted for overnight stay.  He will be given pain medicine, antibiotic prophylaxis, and DVT prophylaxis.

## 2018-11-03 VITALS
DIASTOLIC BLOOD PRESSURE: 69 MMHG | BODY MASS INDEX: 27.65 KG/M2 | HEART RATE: 84 BPM | OXYGEN SATURATION: 97 % | SYSTOLIC BLOOD PRESSURE: 133 MMHG | HEIGHT: 67 IN | TEMPERATURE: 97.7 F | WEIGHT: 176.15 LBS | RESPIRATION RATE: 18 BRPM

## 2018-11-03 PROBLEM — M48.02 CERVICAL SPINAL STENOSIS: Status: RESOLVED | Noted: 2018-10-31 | Resolved: 2018-11-03

## 2018-11-03 PROCEDURE — 25010000003 CEFAZOLIN IN DEXTROSE 2-4 GM/100ML-% SOLUTION: Performed by: ORTHOPAEDIC SURGERY

## 2018-11-03 PROCEDURE — 63710000001 METHYLPREDNISOLONE 4 MG TABLET THERAPY PACK 21 EACH DISP PACK: Performed by: PHYSICIAN ASSISTANT

## 2018-11-03 RX ORDER — CYCLOBENZAPRINE HCL 10 MG
10 TABLET ORAL 3 TIMES DAILY PRN
Qty: 50 TABLET | Refills: 0 | Status: SHIPPED | OUTPATIENT
Start: 2018-11-03

## 2018-11-03 RX ORDER — DIAZEPAM 5 MG/1
5 TABLET ORAL EVERY 8 HOURS PRN
Qty: 50 TABLET | Refills: 0 | Status: SHIPPED | OUTPATIENT
Start: 2018-11-03

## 2018-11-03 RX ORDER — OXYCODONE HYDROCHLORIDE AND ACETAMINOPHEN 5; 325 MG/1; MG/1
1 TABLET ORAL EVERY 6 HOURS PRN
Qty: 70 TABLET | Refills: 0 | Status: SHIPPED | OUTPATIENT
Start: 2018-11-03 | End: 2018-11-10

## 2018-11-03 RX ADMIN — DIAZEPAM 2 MG: 2 TABLET ORAL at 10:59

## 2018-11-03 RX ADMIN — DIAZEPAM 2 MG: 2 TABLET ORAL at 02:31

## 2018-11-03 RX ADMIN — OXYCODONE HYDROCHLORIDE AND ACETAMINOPHEN 2 TABLET: 5; 325 TABLET ORAL at 02:31

## 2018-11-03 RX ADMIN — HYDROMORPHONE HYDROCHLORIDE: 10 INJECTION INTRAMUSCULAR; INTRAVENOUS; SUBCUTANEOUS at 04:16

## 2018-11-03 RX ADMIN — METHYLPREDNISOLONE 4 MG: 4 TABLET ORAL at 06:30

## 2018-11-03 RX ADMIN — DIAZEPAM 2 MG: 2 TABLET ORAL at 06:30

## 2018-11-03 RX ADMIN — CEFAZOLIN SODIUM 2 G: 2 INJECTION, SOLUTION INTRAVENOUS at 02:22

## 2018-11-03 RX ADMIN — OXYCODONE HYDROCHLORIDE AND ACETAMINOPHEN 2 TABLET: 5; 325 TABLET ORAL at 10:58

## 2018-11-03 RX ADMIN — CYCLOBENZAPRINE 10 MG: 10 TABLET, FILM COATED ORAL at 06:29

## 2018-11-03 RX ADMIN — OXYCODONE HYDROCHLORIDE AND ACETAMINOPHEN 2 TABLET: 5; 325 TABLET ORAL at 06:29

## 2018-11-03 NOTE — PLAN OF CARE
Problem: Patient Care Overview  Goal: Plan of Care Review  Outcome: Ongoing (interventions implemented as appropriate)   11/03/18 0332   Coping/Psychosocial   Plan of Care Reviewed With patient;spouse   Plan of Care Review   Progress improving   OTHER   Outcome Summary VSS. Up w/ stand-by assist. Tolerating pain regimen well. Dressing C/D/I. No swelling noted at incisional site. Anticipate discharge today.        Problem: Fall Risk (Adult)  Goal: Identify Related Risk Factors and Signs and Symptoms  Outcome: Ongoing (interventions implemented as appropriate)    Goal: Absence of Fall  Outcome: Ongoing (interventions implemented as appropriate)      Problem: Pain, Acute (Adult)  Goal: Identify Related Risk Factors and Signs and Symptoms  Outcome: Ongoing (interventions implemented as appropriate)

## 2018-11-04 NOTE — DISCHARGE SUMMARY
Date of Admission: 10/31/2018 10:54 AM  Date of Discharge:  11/3/2018    Discharge Diagnosis: s/p Procedure(s):  C3-7 CERVICAL DISCECTOMY ANTERIOR FUSION,      Presenting Problem/History of Present Illness  Cervical spinal stenosis [M48.02]     Attending Physician: Piotr Steven DO    Consults:   Consults     Date and Time Order Name Status Description    11/1/2018 0009 Inpatient Consult to Hospitalist Completed           Procedures Performed  Procedure(s):  C3-7 CERVICAL DISCECTOMY ANTERIOR FUSION       Hospital Course  Patient is a 53 y.o. male presented with cervical stenosis with myelopathy which was unresponsive to conservative treatment.  The patient presented to the operating room for surgery.  DVT and antibiotic prophylaxis were given.  The patient was transferred to [facility].   Physical therapy was consulted to assist with ambulation and transfers.  Oneil catheter was removed.    Condition on Discharge:  The patient's pain was adequately controlled and the patient was thought to be hemodynamically stable for discharge.    Discharge Disposition  Home or Self Care    Discharge Medications     Discharge Medications      New Medications      Instructions Start Date   cyclobenzaprine 10 MG tablet  Commonly known as:  FLEXERIL   10 mg, Oral, 3 Times Daily PRN      diazePAM 5 MG tablet  Commonly known as:  VALIUM   5 mg, Oral, Every 8 Hours PRN      oxyCODONE-acetaminophen 5-325 MG per tablet  Commonly known as:  PERCOCET   1 tablet, Oral, Every 6 Hours PRN         Stop These Medications    aspirin 325 MG tablet     HYDROcodone-acetaminophen 7.5-325 MG per tablet  Commonly known as:  NORCO            Discharge Diet:   Diet Instructions     Diet:       Diet Texture / Consistency:  Dysphagia    Select Dysphagia level:  III - Pureed With Some Mashed    Diet:       Diet Texture / Consistency:  Regular          Activity at Discharge:   Activity Instructions     Other Restrictions (Specify)       Dr. Piotr Steven    [phone]     Post-op Instructions: Cervical Fusion      What are my limitations after surgery?  No lifting over 5 lbs, pushing, pulling, or twisting for the first 6 weeks. In general, do not lift anything heavier than a gallon of milk. It is ok to put on your socks and shoes.   Walking after surgery helps speed recovery and also helps to prevent post-operative blood clots.  You are encouraged to walk daily and increase distance as tolerated.  Activity will be gradually increased after 6 weeks. This will be discussed in detail at your follow up appointment.    You may find it more comfortable to sleep in a recliner or with your head elevated and arms propped on pillows to take the strain off of your neck.  If your surgery was done through the front of your neck, you may notice some difficulty swallowing or hoarseness after surgery.  This usually improves gradually over the course of weeks. Start with soft food and advance your diet as tolerated.  You may drive after 2 weeks. Do not drive while on narcotic pain medication.       Wound care  You may have a drain placed to help with swelling. Empty this as needed. You will need to come to the office tomorrow to have the drain removed.  You may remove your dressing after 48 hours. If you still have drainage after 48 hours, apply a clean dressing as needed. Keep the incision site clean and dry.  Your incision is closed with medical glue and internal sutures.  Unless you are told otherwise, there are no stitches to be removed.  Do not scrub the incision site or pick at the glue.  The glue helps to hold your incision closed as your body heals. It also helps to prevent infection.  Showering is permitted after the first 48 hours as long as the wound is covered and kept dry. Do not get the incision site wet until it is completely scabbed over and no longer draining.   Do not apply any lotions or ointments to the wound until it is completely healed.  Do not submerge the  incision site in a bathtub or pool until it is completely healed and free of scab.      When is my next office appointment?  You should have your first post-op visit in 2 weeks.   Your office visit will be at the [facility] located at [address] [place] [address], [address] [place] [address]                            Pain Control, Medication and Refills  You will be given prescriptions for pain medication at the time of discharge.   You may reduce pain and swelling by applying ice to your neck for 15 minutes at a time. You can do this several times a day for the first few days after surgery.   Call the office for prescription refills. PLEASE GIVE 48 HOURS NOTICE FOR ALL REFILL REQUESTS. ALSO NOTE WE DO NOT TAKE PRESCRIPTION CALLS ON WEEKENDS OR HOLIDAYS.  Narcotics and inactivity can cause constipation. Drink plenty of fluids and eat a high fiber diet. You may need to take an over-the-counter stool softener or laxative.  As the pain improves, you may try taking over the counter acetaminophen (Tylenol) for pain instead of narcotics. Most narcotics also contain Tylenol, so be careful if you are combining these drugs with other Tylenol containing products. Do not exceed the recommended daily dose of Tylenol.  Some studies have implied that anti-inflammatory medicine (NSAIDs) can delay bone healing.  While I recommend avoiding prescription anti-inflammatory medicines, it is probably OK to take over the counter NSAIDs. However, you may want to minimize their use if possible during the first three months after surgery. Examples of prescription NSAIDs include meloxicam (Mobic), Celebrex, Diclofenac, Voltaren, Indocin and Toradol. OTC NSAIDs include ibuprofen (Motrin, Advil), naproxen (Aleve).        Who do I call if I have problems after surgery?  Please call our office at [phone] if you develop any of the following:     Fever (over 101.5°), chills, or sweats  New pain or weakness that began after you  left the hospital  New bowel or bladder difficulty  Excessive swelling around your incision, excessive drainage or pus coming from the incision site  Difficulty breathing or swallowing.   New pain or swelling in the calves.    Office hours: Monday - Friday 8:00 a.m. - 4:30 p.m.    In case of an emergency after business hours, please contact me at the phone number given to you at the time of discharge or go to the nearest emergency room.    Other Restrictions (Specify)       Dr. Piotr Steven   [phone]     Post-op Instructions: Cervical Fusion       What are my limitations after surgery?  No lifting over 5 lbs, pushing, pulling, or twisting for the first 6 weeks. In general, do not lift anything heavier than a gallon of milk. It is ok to put on your socks and shoes.   Walking after surgery helps speed recovery and also helps to prevent post-operative blood clots.  You are encouraged to walk daily and increase distance as tolerated.  Activity will be gradually increased after 6 weeks. This will be discussed in detail at your follow up appointment.    You may find it more comfortable to sleep in a recliner or with your head elevated and arms propped on pillows to take the strain off of your neck.  If your surgery was done through the front of your neck, you may notice some difficulty swallowing or hoarseness after surgery.  This usually improves gradually over the course of weeks. Start with soft food and advance your diet as tolerated.  You may drive after 2 weeks. Do not drive while on narcotic pain medication.       Wound care  You may have a drain placed to help with swelling. Empty this as needed. You will need to come to the office tomorrow to have the drain removed.  You may remove your dressing after 48 hours. If you still have drainage after 48 hours, apply a clean dressing as needed. Keep the incision site clean and dry.  Your incision is closed with medical glue and internal sutures.  Unless you are  told otherwise, there are no stitches to be removed.  Do not scrub the incision site or pick at the glue.  The glue helps to hold your incision closed as your body heals. It also helps to prevent infection.  Showering is permitted after the first 48 hours as long as the wound is covered and kept dry. Do not get the incision site wet until it is completely scabbed over and no longer draining.   Do not apply any lotions or ointments to the wound until it is completely healed.  Do not submerge the incision site in a bathtub or pool until it is completely healed and free of scab.      When is my next office appointment?  You should have your first post-op visit in 2 weeks.   Your office visit will be at the [facility] located at [address] [place] [address], [address] [place] [address]                            Pain Control, Medication and Refills  You will be given prescriptions for pain medication at the time of discharge.   You may reduce pain and swelling by applying ice to your neck for 15 minutes at a time. You can do this several times a day for the first few days after surgery.   Call the office for prescription refills. PLEASE GIVE 48 HOURS NOTICE FOR ALL REFILL REQUESTS. ALSO NOTE WE DO NOT TAKE PRESCRIPTION CALLS ON WEEKENDS OR HOLIDAYS.  Narcotics and inactivity can cause constipation. Drink plenty of fluids and eat a high fiber diet. You may need to take an over-the-counter stool softener or laxative.  As the pain improves, you may try taking over the counter acetaminophen (Tylenol) for pain instead of narcotics. Most narcotics also contain Tylenol, so be careful if you are combining these drugs with other Tylenol containing products. Do not exceed the recommended daily dose of Tylenol.  Some studies have implied that anti-inflammatory medicine (NSAIDs) can delay bone healing.  While I recommend avoiding prescription anti-inflammatory medicines, it is probably OK to take over the counter  NSAIDs. However, you may want to minimize their use if possible during the first three months after surgery. Examples of prescription NSAIDs include meloxicam (Mobic), Celebrex, Diclofenac, Voltaren, Indocin and Toradol. OTC NSAIDs include ibuprofen (Motrin, Advil), naproxen (Aleve).        Who do I call if I have problems after surgery?  Please call our office at [phone] if you develop any of the following:     Fever (over 101.5°), chills, or sweats  New pain or weakness that began after you left the hospital  New bowel or bladder difficulty  Excessive swelling around your incision, excessive drainage or pus coming from the incision site  Difficulty breathing or swallowing.   New pain or swelling in the calves.    Office hours: Monday - Friday 8:00 a.m. - 4:30 p.m.    In case of an emergency after business hours, please contact me at the phone number given to you at the time of discharge or go to the nearest emergency room.          Follow-up Appointments  2 weeks

## 2018-11-05 NOTE — PAYOR COMM NOTE
"Carlos Mcgill  (53 y.o. Male)     Date of Birth Social Security Number Address Home Phone MRN    1965  [id number] [facility] [phone] [id number]    Mormon Marital Status          Mu-ism        Admission Date Admission Type Admitting Provider Attending Provider Department, Room/Bed    10/31/18 Elective Piotr Steven DO  [address], P590/1    Discharge Date Discharge Disposition Discharge Destination        11/3/2018 Home or Self Care              Attending Provider:  (none)   Allergies:  No Known Allergies    Isolation:  None   Infection:  None   Code Status:  Prior    Ht:  170.2 cm (67\")   Wt:  79.9 kg (176 lb 2.4 oz)    Admission Cmt:  None   Principal Problem:  Cervical spinal stenosis [M48.02]                 Active Insurance as of 10/31/2018     Primary Coverage     Payor Plan Insurance Group Employer/Plan Group    [employer] [id number]      Payor Plan Address Payor Plan Phone Number Effective From Effective To    [address] [facility] [phone] 1/1/2018     [facility] [id number]       Subscriber Name Subscriber Birth Date Member ID       CARLOS MCGILL 1965 [id number]                 Emergency Contacts      (Rel.) Home Phone Work Phone Mobile Phone    Ibis Mcgill (Spouse) [phone] -- [phone]               History & Physical      Piotr Steven DO at 10/31/2018  3:57 PM        H&P reviewed. The patient was examined and there are no changes to the H&P.    Electronically signed by Piotr Steven DO at 10/31/2018  3:58 PM   Source Note         Scan on 10/31/2018 : LOC, 10/29/18 (below)            Electronically signed by Interface, Scans Incoming at 10/30/2018 10:26 AM             H&P filed by [employer], [place] at 10/30/2018 10:26 AM     Scan on 10/31/2018 : LOC, 10/29/18 (below)            Electronically signed by Interface, Scans Incoming at 10/30/2018 10:26 AM          Operative/Procedure Notes     "   Piotr Steven DO at 10/31/2018  5:09 PM  Version 1 of 1         CERVICAL DISCECTOMY ANTERIOR FUSION WITH INSTRUMENTATION  Progress Note    Joey Byrnes  10/31/2018 - 11/1/2018    Pre-op Diagnosis:   CS C3-7  Myelopathy       Post-Op Diagnosis Codes:   same    Procedure/CPT® Codes:      Procedure(s):  C3-7 CERVICAL DISCECTOMY ANTERIOR FUSION    Surgeon(s):  Piotr Steven DO    Anesthesia: General    Staff:   Circulator: Celina Akhtar RN; Karey Longo RN  Radiology Technologist: Marianela Spencer RRT  Scrub Person: Rosaline Martinez  Assistant: Wendy Gao PA    Estimated Blood Loss: 200 mL    Urine Voided: * No values recorded between 10/31/2018  4:34 PM and 10/31/2018  7:50 PM *    Specimens:                None      Drains:   Closed/Suction Drain Right Neck Bulb (Active)   Site Description Unable to view 11/2/2018  8:11 AM   Dressing Status Clean;Dry;Intact 11/2/2018  8:11 AM   Drainage Appearance Serosanguineous 11/2/2018  8:11 AM   Status To bulb suction 11/2/2018  8:11 AM   Output (mL) 20 mL 11/2/2018  5:23 AM       [REMOVED] Urethral Catheter Silicone 16 Fr. (Removed)   Daily Indications < 24 hr post op 11/1/2018  9:43 AM   Site Assessment Skin intact;Clean 11/1/2018  9:43 AM   Collection Container Standard drainage bag 11/1/2018  9:43 AM   Securement Method Securing device 11/1/2018  9:43 AM   Output (mL) 350 mL 11/1/2018  6:00 AM       [REMOVED] Urethral Catheter (Removed)       Findings: Severe spinal stenosis      Complications: None apparent        Piotr Steven DO     Date: 11/2/2018  Time: 10:30 AM        Electronically signed by Piotr Steven DO at 11/2/2018 10:31 AM     Piotr Steven DO at 10/31/2018  5:09 PM  Version 1 of 1       Pre-op Diagnosis:   Cervical stenosis C3 to 7  Cervical myelopathy  Post-Op Diagnosis Codes:  Same    Procedure:  1.  Anterior cervical arthrodesis including removal of the disc for decompression of the spinal cord and spinal nerve roots C3-C4 94035  2.   Anterior cervical arthrodesis including removal of the disc for decompression of spinal cord and spinal nerve roots C4-C5 05009  3.  Anterior cervical arthrodesis including removal of the disc for decompression of the spinal cord and spinal nerve roots C5-C6 04936  4.  Anterior cervical arthrodesis including removal of the disc for decompression of the spinal cord and spinal nerve roots C6-C7 91995  5.  Anterior cervical instrumentation C3, C4, C5, C6, and C7 99008  6.  Application of biomechanical intervertebral device C3-C4 35068  7.  Application of biomechanical intervertebral device C4-C5 10514  8.  Application of biomechanical intervertebral device C5-C6 75424  9.  Application of mechanical intervertebral device C6-C7 69786    Surgeon:Piotr Steven DO    Assistant:Wendy Gao PA-C Please note I utilized the services of an assistant, specifically, Wendy Gao PA-C.  Wendy participated in crucial portions of the operation.  The use of Wendy greatly reduced overall operative time thereby reducing overall morbidity for the patient.  Specifically, Wendy provided suctioning hemostasis and retraction for safe decompression of the cervical spine.    Anesthesia:General  Estimated Blood Loss: 100 mL  Specimens: * No orders in the log *  Complications: None apparent  Implant: Alphatec spine  Disposition: Patient recovery in stable condition    Preoperative indications: Patient's a 53 y.o.who is been having severe neck pain with symptoms of myelopathy due to severe cervical stenosis.  He tried and failed extensive conservative care.  He was developing balance disturbance and weakness in his arms.  I reviewed his imaging which showed severe cervical stenosis from C3 to 7.  His cord was being compressed at C3-C4.  I recommended ACDF from C3 7.  The risks of surgery were described to the patient including but not limited to; bleeding, infection, blood clots, pulmonary embolus, heart attack, stroke, risk of recurrent  laryngeal nerve palsy causing voice disturbance, risk of postoperative dysphasia either temporary or permanent, nerve damage or spinal cord injury causing complete or partial paralysis, risk of dural tear causing spinal headache, nonunion, hardware complication, need for further surgery, lack of guarantee, continued pain, continued weakness, and continued numbness.  The patient had many questions about these risks follow which are answered to the best of my ability in layman's terms.  Informed consent obtained.      Procedure in detail: The patient was identified in the preoperative holding area.  All labs and consent following order.  DELROY hose and SCDs were applied for thromboembolic prophylaxis.  Her neck was marked with an indelible marker.  She was transferred to the operative suite where she was given the benefit of general anesthesia 2 g of Kefzol.  Oneil catheter was placed as well as neuro monitoring leads.  She was positioned supine with  arms tucked to the side.  Shoulder roll was placed in her neck was prepped and draped in the usual sterile fashion.  Timeout was performed followed by standard Martinez-Moulton incision to the anterior neck and existing skin crease.  Bovie dissection as well as scissor dissection was performed to the platysma and anterior cervical fascia down the prevertebral fascia.  The trachea esophagus and carotid sheath were retracted while I incised the prevertebral fascia with the Bovie.  A marking device is placed and was confirmed to be in the C3 vertebra with fluoroscopy.  I then elevated the longus coli bilaterally from C3 to 7.  I removed the marking device and performed discectomy at C3-C4.  I used curettes and pituitary to remove cartilaginous endplate as well as degenerative disc.  I used a lamina  to access the posterior annulus and posterior longitudinal ligament which was removed.  Kerrison punch was used to remove osteophytes from the uncovertebral joint.  Full  decompression was performed of the spinal cord and the exiting C5 nerve roots.  I then irrigated and achieved hemostasis.  I completed the same surgery at the remaining 3 levels.  After decompressing the bilateral C5, C6 and C7 nerve roots I trialed and placed peek spacers at all 4 levels. The spacers were filled with autograft bone as well as allograft bone.  64 mm Alphatec trestle lux plate was then affixed to the vertebra with 10 cortical screws.  Final x-rays are showing good placement of the hardware.  Final irrigation and hemostasis was obtained.  A TREVER drain was placed followed by layered closure of the wound.  Sterile dressings were applied.  Patient was awakened general anesthesia, transferred to the hospital bed, and taken to the recovery room in stable condition.    Disposition: The patient will be admitted for overnight stay.  He will be given pain medicine, antibiotic prophylaxis, and DVT prophylaxis.        Electronically signed by Piotr Steven DO at 11/2/2018 10:40 AM          Physician Progress Notes       Wendy Gao PA at 11/3/2018  7:27 AM     Attestation signed by Piotr Steven DO at 11/3/2018  8:17 AM    I have reviewed the documentation above and agree.                    Orthopedic Spine Progress Note    Subjective     Post-Operative Day: 3 post-spine procedure C3-7 CERVICAL DISCECTOMY ANTERIOR FUSION  Systemic or Specific Complaints: Pain Control. Muscle spasms in the neck and arms, slightly improved on valium.    Objective     Vital signs in last 24 hours:  Temp:  [97.3 °F (36.3 °C)-97.7 °F (36.5 °C)] 97.7 °F (36.5 °C)  Heart Rate:  [84-89] 84  Resp:  [18] 18  BP: (114-133)/(69-78) 133/69    General: alert, appears stated age and cooperative   Neurovascular: Stable. Slightly improved triceps strength.   Wound: Wound clean and dry no evidence of infection.   Range of Motion: limited   DVT Exam: No evidence of DVT seen on physical exam.     Data Review  CBC:    Results from last 7  days  Lab Units 18  0513 10/30/18  1301   WBC 10*3/mm3  --  4.80   RBC 10*6/mm3  --  5.09   HEMOGLOBIN g/dL 12.8* 15.1   HEMATOCRIT % 41.0 48.4   PLATELETS 10*3/mm3  --  219     Lab Results   Component Value Date    GLUCOSE 103 (H) 2018    BUN 9 2018    CREATININE 0.79 2018    EGFRIFNONA 103 2018    BCR 11.4 2018    K 4.1 2018    CO2 25.9 2018    CALCIUM 8.5 (L) 2018    ALBUMIN 4.40 10/30/2018    AST 17 10/30/2018    ALT 19 10/30/2018     Assessment/Plan     Status post-spine procedure   Pain Relief: some relief  D/c to home today with increased rx for valium, percocet, and muscle relaxer. He will finish medrol dose pack.  F/u in office in 2 weeks    Activity: out of bed and ambulate    Weight Bearing: FWB     LOS: 3 days     Piotr Steven DO    Date: 11/3/2018    Electronically signed by Piotr Steven DO at 11/3/2018  8:17 AM     Joesph Luong MD at 2018  1:22 PM              Name: Joey Byrnes ADMIT: 10/31/2018   : 1965  PCP: Steffanie Andres APRN    MRN: [id number] LOS: 2 days   AGE/SEX: 53 y.o. male  ROOM: [facility]     Subjective   Doing much better.    Objective   Vital Signs  Temp:  [97.6 °F (36.4 °C)-98 °F (36.7 °C)] 98 °F (36.7 °C)  Heart Rate:  [63-89] 89  Resp:  [18] 18  BP: (114-125)/(72-84) 114/78  SpO2:  [94 %-95 %] 94 %  on   ;   Device (Oxygen Therapy): room air  Body mass index is 27.59 kg/m².    Physical Exam   Constitutional: He is oriented to person, place, and time. No distress.   Cardiovascular: Normal rate and regular rhythm.    No murmur heard.  Pulmonary/Chest: Effort normal and breath sounds normal.   Abdominal: Soft. Bowel sounds are normal. He exhibits no distension. There is no tenderness.   Musculoskeletal: Normal range of motion. He exhibits no edema.   Neurological: He is alert and oriented to person, place, and time.   Skin: Skin is warm and dry. He is not diaphoretic.       Results Review:       I reviewed the  patient's new clinical results.    Results from last 7 days  Lab Units 11/01/18  0513 10/30/18  1301   WBC 10*3/mm3  --  4.80   HEMOGLOBIN g/dL 12.8* 15.1   PLATELETS 10*3/mm3  --  219     Results from last 7 days  Lab Units 11/01/18  0513 10/30/18  1301   SODIUM mmol/L 136 139   POTASSIUM mmol/L 4.1 4.5   CHLORIDE mmol/L 100 102   CO2 mmol/L 25.9 26.6   BUN mg/dL 9 14   CREATININE mg/dL 0.79 0.85   GLUCOSE mg/dL 103* 101*   Estimated Creatinine Clearance: 110.8 mL/min (by C-G formula based on SCr of 0.79 mg/dL).  Results from last 7 days  Lab Units 11/01/18  0513 10/30/18  1301   CALCIUM mg/dL 8.5* 9.4   ALBUMIN g/dL  --  4.40     No results found for: IMRE9TTu results found for: POCGLU      ceFAZolin 2 g Intravenous Q8H   MethylPREDNISolone 4 mg Oral TID Around Food   [START ON 11/3/2018] MethylPREDNISolone 4 mg Oral 4x Daily Taper   [START ON 11/4/2018] MethylPREDNISolone 4 mg Oral TID Around Food   [START ON 11/5/2018] MethylPREDNISolone 4 mg Oral BID   [START ON 11/6/2018] MethylPREDNISolone 4 mg Oral Before Breakfast   MethylPREDNISolone 8 mg Oral Tonight   sodium chloride 3 mL Intravenous Q12H       HYDROmorphone HCl-NaCl     lactated ringers 9 mL/hr Last Rate: 9 mL/hr (10/31/18 1318)   sodium chloride 100 mL/hr Last Rate: 100 mL/hr (11/02/18 1242)         Assessment/Plan     Active Hospital Problems    Diagnosis Date Noted   • **Cervical spinal stenosis [M48.02] 10/31/2018   • Chronic post-traumatic stress disorder (PTSD) [F43.12] 11/01/2018   • Arthritis [M19.90] 11/01/2018   • Anxiety [F41.9] 11/01/2018      Resolved Hospital Problems    Diagnosis Date Noted Date Resolved   No resolved problems to display.       Mr. Byrnes is a 53 y.o. male who is POD#2 C3-7 CERVICAL DISCECTOMY ANTERIOR FUSION.    · Doing well postoperatively.  · Okay with me to discharge.      Joesph Luong MD  [facility]  11/02/18  1:22 PM      Electronically signed by Joesph Luong MD at 11/2/2018  7:35 PM      Piotr Steven DO at 11/2/2018 10:17 AM          Orthopedic Spine Progress Note    Subjective     Post-Operative Day: 2 post-spine procedure C3-7 CERVICAL DISCECTOMY ANTERIOR FUSION  Systemic or Specific Complaints: Pain Control. Muscle spasms in the neck and arms.    Objective     Vital signs in last 24 hours:  Temp:  [97.6 °F (36.4 °C)-98 °F (36.7 °C)] 98 °F (36.7 °C)  Heart Rate:  [63-89] 89  Resp:  [18] 18  BP: (114-125)/(72-84) 114/78    General: alert, appears stated age and cooperative   Neurovascular: stable   Wound: Wound clean and dry no evidence of infection.   Range of Motion: limited   DVT Exam: No evidence of DVT seen on physical exam.     Data Review  CBC:    Results from last 7 days  Lab Units 11/01/18  0513 10/30/18  1301   WBC 10*3/mm3  --  4.80   RBC 10*6/mm3  --  5.09   HEMOGLOBIN g/dL 12.8* 15.1   HEMATOCRIT % 41.0 48.4   PLATELETS 10*3/mm3  --  219     Lab Results   Component Value Date    GLUCOSE 103 (H) 11/01/2018    BUN 9 11/01/2018    CREATININE 0.79 11/01/2018    EGFRIFNONA 103 11/01/2018    BCR 11.4 11/01/2018    K 4.1 11/01/2018    CO2 25.9 11/01/2018    CALCIUM 8.5 (L) 11/01/2018    ALBUMIN 4.40 10/30/2018    AST 17 10/30/2018    ALT 19 10/30/2018     TREVER drain output 30 cc over 24 hoursnight  Assessment/Plan     Status post-spine procedure   Continue pain control with PCA prn, percocet, valium and medrol dose pack    Pain Relief: some relief    Continues current post-op course  D/c drain  Up with physical therapy  Possible d/c to home tomorrow      Activity: out of bed and ambulate    Weight Bearing: FWB     LOS: 2 days     Piotr Steven DO    Date: 11/2/2018    Electronically signed by Piotr Steven DO at 11/2/2018 10:30 AM     Wendy Gao PA at 11/1/2018  7:51 AM     Attestation signed by Piotr Steven DO at 11/3/2018  8:17 AM    I have reviewed the documentation above and agree.                    Orthopedic Spine Progress Note    Subjective     Post-Operative Day: 1  post-spine procedure C3-7 CERVICAL DISCECTOMY ANTERIOR FUSION  Systemic or Specific Complaints: Pain Control  Patient continues to complain of significant pain traveling into his scapular region and both arms.  He reports significant postoperative muscle spasm. He is tolerating liquids well.  Objective     Vital signs in last 24 hours:  Temp:  [97.5 °F (36.4 °C)-98.1 °F (36.7 °C)] 97.5 °F (36.4 °C)  Heart Rate:  [] 72  Resp:  [16-20] 18  BP: (122-158)/() 122/83    General: alert, appears stated age and cooperative   Neurovascular: Decreased strength noted with left finger abduction and bilateral elbow extension.  I had difficulty assessing his deltoid An bicep strength due to pain with manual muscle testing.however, they seem to be grossly intact.  Sensation intact to light touch.   Wound: Wound clean and dry no evidence of infection.   Range of Motion: limited   DVT Exam: No evidence of DVT seen on physical exam.     Data Review  CBC:  Results from last 7 days  Lab Units 11/01/18  0513 10/30/18  1301   WBC 10*3/mm3  --  4.80   RBC 10*6/mm3  --  5.09   HEMOGLOBIN g/dL 12.8* 15.1   HEMATOCRIT % 41.0 48.4   PLATELETS 10*3/mm3  --  219     Lab Results   Component Value Date    GLUCOSE 103 (H) 11/01/2018    BUN 9 11/01/2018    CREATININE 0.79 11/01/2018    EGFRIFNONA 103 11/01/2018    BCR 11.4 11/01/2018    K 4.1 11/01/2018    CO2 25.9 11/01/2018    CALCIUM 8.5 (L) 11/01/2018    ALBUMIN 4.40 10/30/2018    AST 17 10/30/2018    ALT 19 10/30/2018     TREVER drain output 50 cc overnight  Assessment/Plan     Status post-spine procedure     Pain Relief: no relief    Continues current post-op course  The patient had significant cervical stenosis and apparent inflammation of his spinal cord.  I explained that continued nerve pain and muscle spasm is expected and should improve with time.  We will adjust his medications to make him more comfortable.  His pain medicine was increased to Percocet 5/325 mg  He was started on  Valium as needed for muscle spasms.  He will also be started on a Medrol Dosepak.  We'll keep the drain in place for 1 more day.  Continue antibiotics.  Up with physical therapy  Reassess tomorrow.    Activity: out of bed and ambulate    Weight Bearing: FWB     LOS: 1 day     Piotr Steven DO    Date: 11/1/2018    Electronically signed by Piotr Steven DO at 11/3/2018  8:17 AM          Consult Notes       Angel Luong MD at 11/1/2018  9:28 AM      Consult Orders:    1. Inpatient Consult to Hospitalist [[id number]] ordered by Piotr Steven DO at 10/31/18 1602                    Patient Name:  Joey Byrnes  YOB: 1965  MRN:  [id number]  Date of Admission:  10/31/2018  Date of Consult:  11/1/2018  Patient Care Team:  Steffanie Andres APRN as PCP - General (Family Medicine)    Inpatient Hospitalist Consult  Consult performed by: ANGEL LUONG  Consult ordered by: PIOTR STEVEN  Reason for consult: Evaluate status and make recommendations regarding treatment for medical problems.          Subjective   History of Present Illness  Mr. Byrnes is a 53 y.o. male that has been admitted to [facility] following elective C3-7 CERVICAL DISCECTOMY ANTERIOR FUSION. He has been admitted to an orthopedic floor following surgery and we were asked to see and assist with his medical problems. At the time of my visit he denies any chest pain, SOA, nausea, vomiting or diarrhea. He has tolerated a diet. He does complain of a lot of postoperative discomfort including severe muscle spasms. He reports being in a normal state of health leading up to surgery.      Past Medical History:   Diagnosis Date   • Anxiety    • Arthritis    • Back pain    • Chronic post-traumatic stress disorder (PTSD)    • Depression    • History of concussion    • History of mononucleosis    • Migraine      Past Surgical History:   Procedure Laterality Date   • ANTERIOR CERVICAL DISCECTOMY W/ FUSION N/A 10/31/2018    Procedure: C3-7  CERVICAL DISCECTOMY ANTERIOR FUSION;  Surgeon: Piotr Steven DO;  Location: [facility] MAIN OR;  Service: Orthopedic Spine   • COLONOSCOPY     • INGUINAL HERNIA REPAIR Right    • KNEE ARTHROSCOPY Bilateral     meniscus   • VASECTOMY       Family History   Problem Relation Age of Onset   • Malig Hyperthermia Neg Hx      Social History   Substance Use Topics   • Smoking status: Never Smoker   • Smokeless tobacco: Current User     Types: Snuff      Comment: 1 can weekly   • Alcohol use No     Prescriptions Prior to Admission   Medication Sig Dispense Refill Last Dose   • HYDROcodone-acetaminophen (NORCO) 7.5-325 MG per tablet Take 1 tablet by mouth Every 6 (Six) Hours As Needed for Moderate Pain .   10/31/2018 at Unknown time   • aspirin 325 MG tablet Take 325 mg by mouth Daily.   10/27/2018     Allergies:  Patient has no known allergies.    Review of Systems   Constitutional: Negative.    HENT: Negative.  Negative for trouble swallowing.    Eyes: Negative.    Respiratory: Negative.    Gastrointestinal: Negative.    Endocrine: Negative.    Genitourinary: Negative.    Musculoskeletal: Negative.    Skin: Negative.    Neurological: Negative.    Hematological: Negative.    Psychiatric/Behavioral: Negative.        Objective      Vital Signs  Temp:  [97.5 °F (36.4 °C)-98.1 °F (36.7 °C)] 97.5 °F (36.4 °C)  Heart Rate:  [] 72  Resp:  [16-20] 18  BP: (122-158)/() 122/83  Body mass index is 27.59 kg/m².    Physical Exam   Constitutional: He is oriented to person, place, and time. He appears well-developed and well-nourished. He appears distressed.   HENT:   Head: Normocephalic and atraumatic.   Eyes: Conjunctivae and EOM are normal. No scleral icterus.   Neck: Normal range of motion. Neck supple. No JVD present.   Surgical drain in place   Cardiovascular: Normal rate and regular rhythm.    No murmur heard.  Pulmonary/Chest: Effort normal. No respiratory distress. He has decreased breath sounds.   Abdominal: Soft. Bowel  sounds are normal. He exhibits no distension. There is no tenderness.   Musculoskeletal: He exhibits no edema.   Neurological: He is alert and oriented to person, place, and time. No cranial nerve deficit.   Skin: Skin is warm and dry. He is not diaphoretic.   Psychiatric: He has a normal mood and affect. His behavior is normal.   Vitals reviewed.      Results Review:   I reviewed the patient's new clinical results.  I reviewed the patient's new imaging results and agree with the interpretation.  I reviewed the patient's other test results and agree with the interpretation  I personally viewed and interpreted the patient's EKG/Telemetry data      Assessment/Plan     Active Hospital Problems    Diagnosis Date Noted   • **Cervical spinal stenosis [M48.02] 10/31/2018   • Chronic post-traumatic stress disorder (PTSD) [F43.12] 11/01/2018   • Arthritis [M19.90] 11/01/2018   • Anxiety [F41.9] 11/01/2018      Resolved Hospital Problems    Diagnosis Date Noted Date Resolved   No resolved problems to display.       Mr. Byrnes is a 53 y.o. male who is POD#1 C3-7 CERVICAL DISCECTOMY ANTERIOR FUSION.    · He seems to be doing okay thus far postoperatively. Surgery is adjusting his medications to treat his pain and muscle spasms. Discussed with nursing staff.  · Anticipate fluctuations in BP due to blood loss, hypovolemia, anesthesia, narcotics etc.   · Continue IVFs as ordered. Monitor renal function.  · SCDs have been ordered for DVT prophylaxis per surgery.  · He was encouraged to use incentive spirometer as instructed.      Thank you very much for asking A to be involved in this patient's care. We will follow along with you.      Joesph Luong MD  [facility]  11/01/18  9:28 AM          Electronically signed by Joesph Luong MD at 11/1/2018 11:55 AM          Discharge Summary      Piotr Steven, DO at 11/3/2018 11:17 AM          Date of Admission: 10/31/2018 10:54 AM  Date of Discharge:   11/3/2018    Discharge Diagnosis: s/p Procedure(s):  C3-7 CERVICAL DISCECTOMY ANTERIOR FUSION,      Presenting Problem/History of Present Illness  Cervical spinal stenosis [M48.02]     Attending Physician: Piotr Steven DO    Consults:   Consults     Date and Time Order Name Status Description    11/1/2018 0009 Inpatient Consult to Hospitalist Completed           Procedures Performed  Procedure(s):  C3-7 CERVICAL DISCECTOMY ANTERIOR FUSION       Hospital Course  Patient is a 53 y.o. male presented with cervical stenosis with myelopathy which was unresponsive to conservative treatment.  The patient presented to the operating room for surgery.  DVT and antibiotic prophylaxis were given.  The patient was transferred to [facility].   Physical therapy was consulted to assist with ambulation and transfers.  Oneil catheter was removed.    Condition on Discharge:  The patient's pain was adequately controlled and the patient was thought to be hemodynamically stable for discharge.    Discharge Disposition  Home or Self Care    Discharge Medications     Discharge Medications      New Medications      Instructions Start Date   cyclobenzaprine 10 MG tablet  Commonly known as:  FLEXERIL   10 mg, Oral, 3 Times Daily PRN      diazePAM 5 MG tablet  Commonly known as:  VALIUM   5 mg, Oral, Every 8 Hours PRN      oxyCODONE-acetaminophen 5-325 MG per tablet  Commonly known as:  PERCOCET   1 tablet, Oral, Every 6 Hours PRN         Stop These Medications    aspirin 325 MG tablet     HYDROcodone-acetaminophen 7.5-325 MG per tablet  Commonly known as:  NORCO            Discharge Diet:   Diet Instructions     Diet:       Diet Texture / Consistency:  Dysphagia    Select Dysphagia level:  III - Pureed With Some Mashed    Diet:       Diet Texture / Consistency:  Regular          Activity at Discharge:   Activity Instructions     Other Restrictions (Specify)       Dr. Piotr Steven   [phone]     Post-op Instructions: Cervical  Fusion      What are my limitations after surgery?  No lifting over 5 lbs, pushing, pulling, or twisting for the first 6 weeks. In general, do not lift anything heavier than a gallon of milk. It is ok to put on your socks and shoes.   Walking after surgery helps speed recovery and also helps to prevent post-operative blood clots.  You are encouraged to walk daily and increase distance as tolerated.  Activity will be gradually increased after 6 weeks. This will be discussed in detail at your follow up appointment.    You may find it more comfortable to sleep in a recliner or with your head elevated and arms propped on pillows to take the strain off of your neck.  If your surgery was done through the front of your neck, you may notice some difficulty swallowing or hoarseness after surgery.  This usually improves gradually over the course of weeks. Start with soft food and advance your diet as tolerated.  You may drive after 2 weeks. Do not drive while on narcotic pain medication.       Wound care  You may have a drain placed to help with swelling. Empty this as needed. You will need to come to the office tomorrow to have the drain removed.  You may remove your dressing after 48 hours. If you still have drainage after 48 hours, apply a clean dressing as needed. Keep the incision site clean and dry.  Your incision is closed with medical glue and internal sutures.  Unless you are told otherwise, there are no stitches to be removed.  Do not scrub the incision site or pick at the glue.  The glue helps to hold your incision closed as your body heals. It also helps to prevent infection.  Showering is permitted after the first 48 hours as long as the wound is covered and kept dry. Do not get the incision site wet until it is completely scabbed over and no longer draining.   Do not apply any lotions or ointments to the wound until it is completely healed.  Do not submerge the incision site in a bathtub or pool until it is  completely healed and free of scab.      When is my next office appointment?  You should have your first post-op visit in 2 weeks.   Your office visit will be at the [facility] located at [address], [address] [place] [address]                            Pain Control, Medication and Refills  You will be given prescriptions for pain medication at the time of discharge.   You may reduce pain and swelling by applying ice to your neck for 15 minutes at a time. You can do this several times a day for the first few days after surgery.   Call the office for prescription refills. PLEASE GIVE 48 HOURS NOTICE FOR ALL REFILL REQUESTS. ALSO NOTE WE DO NOT TAKE PRESCRIPTION CALLS ON WEEKENDS OR HOLIDAYS.  Narcotics and inactivity can cause constipation. Drink plenty of fluids and eat a high fiber diet. You may need to take an over-the-counter stool softener or laxative.  As the pain improves, you may try taking over the counter acetaminophen (Tylenol) for pain instead of narcotics. Most narcotics also contain Tylenol, so be careful if you are combining these drugs with other Tylenol containing products. Do not exceed the recommended daily dose of Tylenol.  Some studies have implied that anti-inflammatory medicine (NSAIDs) can delay bone healing.  While I recommend avoiding prescription anti-inflammatory medicines, it is probably OK to take over the counter NSAIDs. However, you may want to minimize their use if possible during the first three months after surgery. Examples of prescription NSAIDs include meloxicam (Mobic), Celebrex, Diclofenac, Voltaren, Indocin and Toradol. OTC NSAIDs include ibuprofen (Motrin, Advil), naproxen (Aleve).        Who do I call if I have problems after surgery?  Please call our office at [phone] if you develop any of the following:     Fever (over 101.5°), chills, or sweats  New pain or weakness that began after you left the hospital  New bowel or bladder  difficulty  Excessive swelling around your incision, excessive drainage or pus coming from the incision site  Difficulty breathing or swallowing.   New pain or swelling in the calves.    Office hours: Monday - Friday 8:00 a.m. - 4:30 p.m.    In case of an emergency after business hours, please contact me at the phone number given to you at the time of discharge or go to the nearest emergency room.    Other Restrictions (Specify)       Dr. Piotr Steven   [phone]     Post-op Instructions: Cervical Fusion       What are my limitations after surgery?  No lifting over 5 lbs, pushing, pulling, or twisting for the first 6 weeks. In general, do not lift anything heavier than a gallon of milk. It is ok to put on your socks and shoes.   Walking after surgery helps speed recovery and also helps to prevent post-operative blood clots.  You are encouraged to walk daily and increase distance as tolerated.  Activity will be gradually increased after 6 weeks. This will be discussed in detail at your follow up appointment.    You may find it more comfortable to sleep in a recliner or with your head elevated and arms propped on pillows to take the strain off of your neck.  If your surgery was done through the front of your neck, you may notice some difficulty swallowing or hoarseness after surgery.  This usually improves gradually over the course of weeks. Start with soft food and advance your diet as tolerated.  You may drive after 2 weeks. Do not drive while on narcotic pain medication.       Wound care  You may have a drain placed to help with swelling. Empty this as needed. You will need to come to the office tomorrow to have the drain removed.  You may remove your dressing after 48 hours. If you still have drainage after 48 hours, apply a clean dressing as needed. Keep the incision site clean and dry.  Your incision is closed with medical glue and internal sutures.  Unless you are told otherwise, there are no stitches to be  removed.  Do not scrub the incision site or pick at the glue.  The glue helps to hold your incision closed as your body heals. It also helps to prevent infection.  Showering is permitted after the first 48 hours as long as the wound is covered and kept dry. Do not get the incision site wet until it is completely scabbed over and no longer draining.   Do not apply any lotions or ointments to the wound until it is completely healed.  Do not submerge the incision site in a bathtub or pool until it is completely healed and free of scab.      When is my next office appointment?  You should have your first post-op visit in 2 weeks.   Your office visit will be at the [facility] located at [address], [address] [place] [address]                            Pain Control, Medication and Refills  You will be given prescriptions for pain medication at the time of discharge.   You may reduce pain and swelling by applying ice to your neck for 15 minutes at a time. You can do this several times a day for the first few days after surgery.   Call the office for prescription refills. PLEASE GIVE 48 HOURS NOTICE FOR ALL REFILL REQUESTS. ALSO NOTE WE DO NOT TAKE PRESCRIPTION CALLS ON WEEKENDS OR HOLIDAYS.  Narcotics and inactivity can cause constipation. Drink plenty of fluids and eat a high fiber diet. You may need to take an over-the-counter stool softener or laxative.  As the pain improves, you may try taking over the counter acetaminophen (Tylenol) for pain instead of narcotics. Most narcotics also contain Tylenol, so be careful if you are combining these drugs with other Tylenol containing products. Do not exceed the recommended daily dose of Tylenol.  Some studies have implied that anti-inflammatory medicine (NSAIDs) can delay bone healing.  While I recommend avoiding prescription anti-inflammatory medicines, it is probably OK to take over the counter NSAIDs. However, you may want to minimize their  use if possible during the first three months after surgery. Examples of prescription NSAIDs include meloxicam (Mobic), Celebrex, Diclofenac, Voltaren, Indocin and Toradol. OTC NSAIDs include ibuprofen (Motrin, Advil), naproxen (Aleve).        Who do I call if I have problems after surgery?  Please call our office at [phone] if you develop any of the following:     Fever (over 101.5°), chills, or sweats  New pain or weakness that began after you left the hospital  New bowel or bladder difficulty  Excessive swelling around your incision, excessive drainage or pus coming from the incision site  Difficulty breathing or swallowing.   New pain or swelling in the calves.    Office hours: Monday - Friday 8:00 a.m. - 4:30 p.m.    In case of an emergency after business hours, please contact me at the phone number given to you at the time of discharge or go to the nearest emergency room.          Follow-up Appointments  2 weeks                   Electronically signed by Piotr Steven DO at 11/4/2018  9:06 AM

## 2019-03-19 NOTE — PROGRESS NOTES
Orthopedic Spine Progress Note    Subjective     Post-Operative Day: 3 post-spine procedure C3-7 CERVICAL DISCECTOMY ANTERIOR FUSION  Systemic or Specific Complaints: Pain Control. Muscle spasms in the neck and arms, slightly improved on valium.    Objective     Vital signs in last 24 hours:  Temp:  [97.3 °F (36.3 °C)-97.7 °F (36.5 °C)] 97.7 °F (36.5 °C)  Heart Rate:  [84-89] 84  Resp:  [18] 18  BP: (114-133)/(69-78) 133/69    General: alert, appears stated age and cooperative   Neurovascular: Stable. Slightly improved triceps strength.   Wound: Wound clean and dry no evidence of infection.   Range of Motion: limited   DVT Exam: No evidence of DVT seen on physical exam.     Data Review  CBC:    Results from last 7 days  Lab Units 11/01/18  0513 10/30/18  1301   WBC 10*3/mm3  --  4.80   RBC 10*6/mm3  --  5.09   HEMOGLOBIN g/dL 12.8* 15.1   HEMATOCRIT % 41.0 48.4   PLATELETS 10*3/mm3  --  219     Lab Results   Component Value Date    GLUCOSE 103 (H) 11/01/2018    BUN 9 11/01/2018    CREATININE 0.79 11/01/2018    EGFRIFNONA 103 11/01/2018    BCR 11.4 11/01/2018    K 4.1 11/01/2018    CO2 25.9 11/01/2018    CALCIUM 8.5 (L) 11/01/2018    ALBUMIN 4.40 10/30/2018    AST 17 10/30/2018    ALT 19 10/30/2018     Assessment/Plan     Status post-spine procedure   Pain Relief: some relief  D/c to home today with increased rx for valium, percocet, and muscle relaxer. He will finish medrol dose pack.  F/u in office in 2 weeks    Activity: out of bed and ambulate    Weight Bearing: FWB     LOS: 3 days     Piotr Steven DO    Date: 11/3/2018   18-Mar-2019 16:41 19-Mar-2019 18:34

## 2024-08-28 ENCOUNTER — PRE-ADMISSION TESTING (OUTPATIENT)
Dept: PREADMISSION TESTING | Facility: HOSPITAL | Age: 59
End: 2024-08-28
Payer: OTHER GOVERNMENT

## 2024-08-28 VITALS
HEART RATE: 67 BPM | RESPIRATION RATE: 16 BRPM | DIASTOLIC BLOOD PRESSURE: 82 MMHG | TEMPERATURE: 97.3 F | SYSTOLIC BLOOD PRESSURE: 136 MMHG | HEIGHT: 67 IN | OXYGEN SATURATION: 98 % | WEIGHT: 173.5 LBS | BODY MASS INDEX: 27.23 KG/M2

## 2024-08-28 LAB
ANION GAP SERPL CALCULATED.3IONS-SCNC: 9 MMOL/L (ref 5–15)
BUN SERPL-MCNC: 12 MG/DL (ref 6–20)
BUN/CREAT SERPL: 12.5 (ref 7–25)
CALCIUM SPEC-SCNC: 9.5 MG/DL (ref 8.6–10.5)
CHLORIDE SERPL-SCNC: 106 MMOL/L (ref 98–107)
CO2 SERPL-SCNC: 25 MMOL/L (ref 22–29)
CREAT SERPL-MCNC: 0.96 MG/DL (ref 0.76–1.27)
DEPRECATED RDW RBC AUTO: 44.2 FL (ref 37–54)
EGFRCR SERPLBLD CKD-EPI 2021: 91.6 ML/MIN/1.73
ERYTHROCYTE [DISTWIDTH] IN BLOOD BY AUTOMATED COUNT: 13 % (ref 12.3–15.4)
GLUCOSE SERPL-MCNC: 108 MG/DL (ref 65–99)
HBA1C MFR BLD: 5.4 % (ref 4.8–5.6)
HCT VFR BLD AUTO: 44.4 % (ref 37.5–51)
HGB BLD-MCNC: 14.5 G/DL (ref 13–17.7)
MCH RBC QN AUTO: 30.5 PG (ref 26.6–33)
MCHC RBC AUTO-ENTMCNC: 32.7 G/DL (ref 31.5–35.7)
MCV RBC AUTO: 93.3 FL (ref 79–97)
PLATELET # BLD AUTO: 202 10*3/MM3 (ref 140–450)
PMV BLD AUTO: 9.7 FL (ref 6–12)
POTASSIUM SERPL-SCNC: 4.2 MMOL/L (ref 3.5–5.2)
QT INTERVAL: 434 MS
QTC INTERVAL: 434 MS
RBC # BLD AUTO: 4.76 10*6/MM3 (ref 4.14–5.8)
SODIUM SERPL-SCNC: 140 MMOL/L (ref 136–145)
WBC NRBC COR # BLD AUTO: 4.28 10*3/MM3 (ref 3.4–10.8)

## 2024-08-28 PROCEDURE — 36415 COLL VENOUS BLD VENIPUNCTURE: CPT

## 2024-08-28 PROCEDURE — 80048 BASIC METABOLIC PNL TOTAL CA: CPT

## 2024-08-28 PROCEDURE — 85027 COMPLETE CBC AUTOMATED: CPT

## 2024-08-28 PROCEDURE — 93005 ELECTROCARDIOGRAM TRACING: CPT

## 2024-08-28 PROCEDURE — 83036 HEMOGLOBIN GLYCOSYLATED A1C: CPT

## 2024-08-28 RX ORDER — DULOXETIN HYDROCHLORIDE 60 MG/1
60 CAPSULE, DELAYED RELEASE ORAL DAILY
COMMUNITY

## 2024-08-28 RX ORDER — CELECOXIB 200 MG/1
1 CAPSULE ORAL DAILY
COMMUNITY
Start: 2024-02-02 | End: 2025-02-02

## 2024-08-28 RX ORDER — PREGABALIN 75 MG/1
75 CAPSULE ORAL 2 TIMES DAILY
COMMUNITY
Start: 2024-04-11 | End: 2024-10-12

## 2024-08-28 RX ORDER — ALBUTEROL SULFATE 90 UG/1
2 AEROSOL, METERED RESPIRATORY (INHALATION) EVERY 4 HOURS PRN
COMMUNITY

## 2024-08-28 RX ORDER — TRAZODONE HYDROCHLORIDE 50 MG/1
50 TABLET, FILM COATED ORAL NIGHTLY
COMMUNITY
Start: 2023-11-13 | End: 2024-11-13

## 2024-08-28 RX ORDER — PANTOPRAZOLE SODIUM 40 MG/1
40 TABLET, DELAYED RELEASE ORAL DAILY
COMMUNITY
Start: 2023-09-25 | End: 2024-09-25

## 2024-08-28 RX ORDER — ACETAMINOPHEN 500 MG
500 TABLET ORAL EVERY 6 HOURS PRN
COMMUNITY

## 2024-08-28 NOTE — DISCHARGE INSTRUCTIONS
Take the following medications the morning of surgery:  PANTOPRAZOLE, DULOXETINE, PREGABALIN AND BRING INHALER    STOP CELECOXIB 7 DAYS PRIOR TO SURGERY        If you are on prescription narcotic pain medication to control your pain you may also take that medication the morning of surgery.      General Instructions:     Do not eat solid food after midnight the night before surgery.  Clear liquids day of surgery are allowed but must be stopped at least two hours before your hospital arrival time.       Allowed clear liquids      Water, sodas, and tea or coffee with no cream or milk added.       12 to 20 ounces of a clear liquid that contains carbohydrates is recommended.  If non-diabetic, have Gatorade or Powerade.  If diabetic, have G2 or Powerade Zero.     Do not have liquids red in color.  Do not consume chicken, beef, pork or vegetable broth or bouillon cubes of any variety as they are not considered clear liquids and are not allowed.      Infants may have breast milk up to four hours before surgery.  Infants drinking formula may drink formula up to six hours before surgery.   Patients who avoid smoking, chewing tobacco and alcohol for 4 weeks prior to surgery have a reduced risk of post-operative complications.  Quit smoking as many days before surgery as you can.  Do not smoke, use chewing tobacco or drink alcohol the day of surgery.   If applicable bring your C-PAP/ BI-PAP machine in with you to preop day of surgery.  Bring any papers given to you in the doctor’s office.  Wear clean comfortable clothes.  Do not wear contact lenses, false eyelashes or make-up.  Bring a case for your glasses.   Bring crutches or walker if applicable.  Remove all piercings.  Leave jewelry and any other valuables at home.  Hair extensions with metal clips must be removed prior to surgery.  The Pre-Admission Testing nurse will instruct you to bring medications if unable to obtain an accurate list in Pre-Admission Testing.             Preventing a Surgical Site Infection:  For 2 to 3 days before surgery, avoid shaving with a razor because the razor can irritate skin and make it easier to develop an infection.    Any areas of open skin can increase the risk of a post-operative wound infection by allowing bacteria to enter and travel throughout the body.  Notify your surgeon if you have any skin wounds / rashes even if it is not near the expected surgical site.  The area will need assessed to determine if surgery should be delayed until it is healed.  The night prior to surgery shower using a fresh bar of anti-bacterial soap (such as Dial) and clean washcloth.  Sleep in a clean bed with clean clothing.  Do not allow pets to sleep with you.  Shower on the morning of surgery using a fresh bar of anti-bacterial soap (such as Dial) and clean washcloth.  Dry with a clean towel and dress in clean clothing.  Ask your surgeon if you will be receiving antibiotics prior to surgery.  Make sure you, your family, and all healthcare providers clean their hands with soap and water or an alcohol based hand  before caring for you or your wound.    Day of surgery:  Your arrival time is approximately two hours before your scheduled surgery time.  Please note if you have an early arrival time the surgery doors do not open before 5:00 AM.  Upon arrival, a Pre-op nurse and Anesthesiologist will review your health history, obtain vital signs, and answer questions you may have.  The only belongings needed at this time will be a list of your home medications and if applicable your C-PAP/BI-PAP machine.  A Pre-op nurse will start an IV and you may receive medication in preparation for surgery, including something to help you relax.     Please be aware that surgery does come with discomfort.  We want to make every effort to control your discomfort so please discuss any uncontrolled symptoms with your nurse.   Your doctor will most likely have prescribed  pain medications.      If you are going home after surgery you will receive individualized written care instructions before being discharged.  A responsible adult must drive you to and from the hospital on the day of your surgery and ideally stay with you through the night.   .  Discharge prescriptions can be filled by the hospital pharmacy during regular pharmacy hours.  If you are having surgery late in the day/evening your prescription may be e-prescribed to your pharmacy.  Please verify your pharmacy hours or chose a 24 hour pharmacy to avoid not having access to your prescription because your pharmacy has closed for the day.    If you are staying overnight following surgery, you will be transported to your hospital room following the recovery period.  [facility] has all private rooms.    If you have any questions please call Pre-Admission Testing at [phone].  Deductibles and co-payments are collected on the day of service. Please be prepared to pay the required co-pay, deductible or deposit on the day of service as defined by your plan.    Call your surgeon immediately if you experience any of the following symptoms:  Sore Throat  Shortness of Breath or difficulty breathing  Cough  Chills  Body soreness or muscle pain  Headache  Fever  New loss of taste or smell  Do not arrive for your surgery ill.  Your procedure will need to be rescheduled to another time.  You will need to call your physician before the day of surgery to avoid any unnecessary exposure to hospital staff as well as other patients.      CHLORHEXIDINE CLOTH INSTRUCTIONS  The morning of surgery follow these instructions using the Chlorhexidine cloths you've been given.  These steps reduce bacteria on the body.  Do not use the cloths near your eyes, ears mouth, genitalia or on open wounds.  Throw the cloths away after use but do not try to flush them down a toilet.      Open and remove one cloth at a time from the package.     Leave the cloth unfolded and begin the bathing.  Massage the skin with the cloths using gentle pressure to remove bacteria.  Do not scrub harshly.   Follow the steps below with one 2% CHG cloth per area (6 total cloths).  One cloth for neck, shoulders and chest.  One cloth for both arms, hands, fingers and underarms (do underarms last).  One cloth for the abdomen followed by groin.  One cloth for right leg and foot including between the toes.  One cloth for left leg and foot including between the toes.  The last cloth is to be used for the back of the neck, back and buttocks.    Allow the CHG to air dry 3 minutes on the skin which will give it time to work and decrease the chance of irritation.  The skin may feel sticky until it is dry.  Do not rinse with water or any other liquid or you will lose the beneficial effects of the CHG.  If mild skin irritation occurs, do rinse the skin to remove the CHG.  Report this to the nurse at time of admission.  Do not apply lotions, creams, ointments, deodorants or perfumes after using the clothes. Dress in clean clothes before coming to the hospital.

## 2024-09-18 ENCOUNTER — ANESTHESIA EVENT (OUTPATIENT)
Dept: PERIOP | Facility: HOSPITAL | Age: 59
End: 2024-09-18
Payer: OTHER GOVERNMENT

## 2024-09-18 ENCOUNTER — HOSPITAL ENCOUNTER (OUTPATIENT)
Facility: HOSPITAL | Age: 59
Discharge: HOME HEALTH | End: 2024-09-18
Attending: ORTHOPAEDIC SURGERY | Admitting: ORTHOPAEDIC SURGERY
Payer: OTHER GOVERNMENT

## 2024-09-18 ENCOUNTER — ANESTHESIA (OUTPATIENT)
Dept: PERIOP | Facility: HOSPITAL | Age: 59
End: 2024-09-18
Payer: OTHER GOVERNMENT

## 2024-09-18 ENCOUNTER — APPOINTMENT (OUTPATIENT)
Dept: GENERAL RADIOLOGY | Facility: HOSPITAL | Age: 59
End: 2024-09-18
Payer: OTHER GOVERNMENT

## 2024-09-18 VITALS
DIASTOLIC BLOOD PRESSURE: 80 MMHG | OXYGEN SATURATION: 95 % | HEART RATE: 78 BPM | RESPIRATION RATE: 16 BRPM | SYSTOLIC BLOOD PRESSURE: 121 MMHG | TEMPERATURE: 97.4 F

## 2024-09-18 DIAGNOSIS — T84.093A FAILED TOTAL KNEE, LEFT, INITIAL ENCOUNTER: Primary | ICD-10-CM

## 2024-09-18 PROCEDURE — 25010000002 DEXAMETHASONE PER 1 MG: Performed by: STUDENT IN AN ORGANIZED HEALTH CARE EDUCATION/TRAINING PROGRAM

## 2024-09-18 PROCEDURE — 25010000002 GLYCOPYRROLATE 1 MG/5ML SOLUTION: Performed by: NURSE ANESTHETIST, CERTIFIED REGISTERED

## 2024-09-18 PROCEDURE — 25010000002 MORPHINE PER 10 MG: Performed by: ORTHOPAEDIC SURGERY

## 2024-09-18 PROCEDURE — 97161 PT EVAL LOW COMPLEX 20 MIN: CPT

## 2024-09-18 PROCEDURE — 25010000002 MIDAZOLAM PER 1 MG: Performed by: STUDENT IN AN ORGANIZED HEALTH CARE EDUCATION/TRAINING PROGRAM

## 2024-09-18 PROCEDURE — 25010000002 ROPIVACAINE PER 1 MG: Performed by: STUDENT IN AN ORGANIZED HEALTH CARE EDUCATION/TRAINING PROGRAM

## 2024-09-18 PROCEDURE — 25010000002 HYDROMORPHONE PER 4 MG: Performed by: NURSE ANESTHETIST, CERTIFIED REGISTERED

## 2024-09-18 PROCEDURE — 25010000002 ONDANSETRON PER 1 MG: Performed by: NURSE ANESTHETIST, CERTIFIED REGISTERED

## 2024-09-18 PROCEDURE — 25010000002 FENTANYL CITRATE (PF) 50 MCG/ML SOLUTION: Performed by: NURSE ANESTHETIST, CERTIFIED REGISTERED

## 2024-09-18 PROCEDURE — 25010000002 CEFAZOLIN PER 500 MG: Performed by: ORTHOPAEDIC SURGERY

## 2024-09-18 PROCEDURE — 25810000003 LACTATED RINGERS PER 1000 ML: Performed by: STUDENT IN AN ORGANIZED HEALTH CARE EDUCATION/TRAINING PROGRAM

## 2024-09-18 PROCEDURE — 97116 GAIT TRAINING THERAPY: CPT

## 2024-09-18 PROCEDURE — 25010000002 KETOROLAC TROMETHAMINE PER 15 MG: Performed by: ORTHOPAEDIC SURGERY

## 2024-09-18 PROCEDURE — 25010000002 HYDROMORPHONE 1 MG/ML SOLUTION: Performed by: NURSE ANESTHETIST, CERTIFIED REGISTERED

## 2024-09-18 PROCEDURE — 25010000002 PROPOFOL 1000 MG/100ML EMULSION: Performed by: NURSE ANESTHETIST, CERTIFIED REGISTERED

## 2024-09-18 PROCEDURE — 25010000002 EPINEPHRINE 1 MG/ML SOLUTION 30 ML VIAL: Performed by: ORTHOPAEDIC SURGERY

## 2024-09-18 PROCEDURE — 25010000002 FENTANYL CITRATE (PF) 50 MCG/ML SOLUTION: Performed by: STUDENT IN AN ORGANIZED HEALTH CARE EDUCATION/TRAINING PROGRAM

## 2024-09-18 PROCEDURE — 25010000002 ROPIVACAINE PER 1 MG: Performed by: ORTHOPAEDIC SURGERY

## 2024-09-18 PROCEDURE — 25810000003 LACTATED RINGERS PER 1000 ML: Performed by: NURSE ANESTHETIST, CERTIFIED REGISTERED

## 2024-09-18 PROCEDURE — 73560 X-RAY EXAM OF KNEE 1 OR 2: CPT

## 2024-09-18 PROCEDURE — 25010000002 SUGAMMADEX 200 MG/2ML SOLUTION: Performed by: NURSE ANESTHETIST, CERTIFIED REGISTERED

## 2024-09-18 PROCEDURE — C1776 JOINT DEVICE (IMPLANTABLE): HCPCS | Performed by: ORTHOPAEDIC SURGERY

## 2024-09-18 DEVICE — DEV CONTRL TISS STRATAFIX SPIRAL MNCRYL UD 3/0 PLS 30CM: Type: IMPLANTABLE DEVICE | Site: KNEE | Status: FUNCTIONAL

## 2024-09-18 DEVICE — DEV CONTRL TISS STRATAFIX PDS PLS OS6 REV SZ1 18IN 45CM: Type: IMPLANTABLE DEVICE | Site: KNEE | Status: FUNCTIONAL

## 2024-09-18 DEVICE — LEGION POSTERIOR STABILIZED HIGH                                    FLEX HIGHLY CROSS LINKED                                    POLYETHYLENE SIZE 5-6 11MM
Type: IMPLANTABLE DEVICE | Site: KNEE | Status: FUNCTIONAL
Brand: LEGION

## 2024-09-18 RX ORDER — SODIUM CHLORIDE, SODIUM LACTATE, POTASSIUM CHLORIDE, CALCIUM CHLORIDE 600; 310; 30; 20 MG/100ML; MG/100ML; MG/100ML; MG/100ML
9 INJECTION, SOLUTION INTRAVENOUS CONTINUOUS
Status: DISCONTINUED | OUTPATIENT
Start: 2024-09-18 | End: 2024-09-18 | Stop reason: HOSPADM

## 2024-09-18 RX ORDER — ONDANSETRON 2 MG/ML
INJECTION INTRAMUSCULAR; INTRAVENOUS AS NEEDED
Status: DISCONTINUED | OUTPATIENT
Start: 2024-09-18 | End: 2024-09-18 | Stop reason: SURG

## 2024-09-18 RX ORDER — IPRATROPIUM BROMIDE AND ALBUTEROL SULFATE 2.5; .5 MG/3ML; MG/3ML
3 SOLUTION RESPIRATORY (INHALATION) ONCE AS NEEDED
Status: DISCONTINUED | OUTPATIENT
Start: 2024-09-18 | End: 2024-09-18 | Stop reason: HOSPADM

## 2024-09-18 RX ORDER — HYDROMORPHONE HYDROCHLORIDE 1 MG/ML
0.5 INJECTION, SOLUTION INTRAMUSCULAR; INTRAVENOUS; SUBCUTANEOUS
Status: DISCONTINUED | OUTPATIENT
Start: 2024-09-18 | End: 2024-09-18 | Stop reason: HOSPADM

## 2024-09-18 RX ORDER — TRANEXAMIC ACID 650 MG/1
650 TABLET ORAL DAILY
Qty: 2 TABLET | Refills: 0 | Status: SHIPPED | OUTPATIENT
Start: 2024-09-19 | End: 2024-09-21

## 2024-09-18 RX ORDER — ROCURONIUM BROMIDE 10 MG/ML
INJECTION, SOLUTION INTRAVENOUS AS NEEDED
Status: DISCONTINUED | OUTPATIENT
Start: 2024-09-18 | End: 2024-09-18 | Stop reason: SURG

## 2024-09-18 RX ORDER — MELOXICAM 15 MG/1
15 TABLET ORAL ONCE
Status: COMPLETED | OUTPATIENT
Start: 2024-09-18 | End: 2024-09-18

## 2024-09-18 RX ORDER — DEXAMETHASONE SODIUM PHOSPHATE 4 MG/ML
INJECTION, SOLUTION INTRA-ARTICULAR; INTRALESIONAL; INTRAMUSCULAR; INTRAVENOUS; SOFT TISSUE AS NEEDED
Status: DISCONTINUED | OUTPATIENT
Start: 2024-09-18 | End: 2024-09-18 | Stop reason: SURG

## 2024-09-18 RX ORDER — PROPOFOL 10 MG/ML
INJECTION, EMULSION INTRAVENOUS AS NEEDED
Status: DISCONTINUED | OUTPATIENT
Start: 2024-09-18 | End: 2024-09-18 | Stop reason: SURG

## 2024-09-18 RX ORDER — ASPIRIN 81 MG/1
81 TABLET ORAL 2 TIMES DAILY
Qty: 60 TABLET | Refills: 0 | Status: SHIPPED | OUTPATIENT
Start: 2024-09-18 | End: 2024-10-18

## 2024-09-18 RX ORDER — PROMETHAZINE HYDROCHLORIDE 25 MG/1
25 TABLET ORAL ONCE AS NEEDED
Status: DISCONTINUED | OUTPATIENT
Start: 2024-09-18 | End: 2024-09-18 | Stop reason: HOSPADM

## 2024-09-18 RX ORDER — DEXAMETHASONE SODIUM PHOSPHATE 4 MG/ML
INJECTION, SOLUTION INTRA-ARTICULAR; INTRALESIONAL; INTRAMUSCULAR; INTRAVENOUS; SOFT TISSUE
Status: COMPLETED | OUTPATIENT
Start: 2024-09-18 | End: 2024-09-18

## 2024-09-18 RX ORDER — ROPIVACAINE HYDROCHLORIDE 5 MG/ML
INJECTION, SOLUTION EPIDURAL; INFILTRATION; PERINEURAL
Status: COMPLETED | OUTPATIENT
Start: 2024-09-18 | End: 2024-09-18

## 2024-09-18 RX ORDER — SODIUM CHLORIDE 0.9 % (FLUSH) 0.9 %
3 SYRINGE (ML) INJECTION EVERY 12 HOURS SCHEDULED
Status: DISCONTINUED | OUTPATIENT
Start: 2024-09-18 | End: 2024-09-18 | Stop reason: HOSPADM

## 2024-09-18 RX ORDER — HYDRALAZINE HYDROCHLORIDE 20 MG/ML
5 INJECTION INTRAMUSCULAR; INTRAVENOUS
Status: DISCONTINUED | OUTPATIENT
Start: 2024-09-18 | End: 2024-09-18 | Stop reason: HOSPADM

## 2024-09-18 RX ORDER — MIDAZOLAM HYDROCHLORIDE 1 MG/ML
1 INJECTION INTRAMUSCULAR; INTRAVENOUS
Status: COMPLETED | OUTPATIENT
Start: 2024-09-18 | End: 2024-09-18

## 2024-09-18 RX ORDER — SODIUM CHLORIDE 0.9 % (FLUSH) 0.9 %
3-10 SYRINGE (ML) INJECTION AS NEEDED
Status: DISCONTINUED | OUTPATIENT
Start: 2024-09-18 | End: 2024-09-18 | Stop reason: HOSPADM

## 2024-09-18 RX ORDER — CEPHALEXIN 500 MG/1
1000 CAPSULE ORAL 4 TIMES DAILY
Qty: 8 CAPSULE | Refills: 0 | Status: SHIPPED | OUTPATIENT
Start: 2024-09-18 | End: 2024-09-19

## 2024-09-18 RX ORDER — PROMETHAZINE HYDROCHLORIDE 25 MG/1
25 SUPPOSITORY RECTAL ONCE AS NEEDED
Status: DISCONTINUED | OUTPATIENT
Start: 2024-09-18 | End: 2024-09-18 | Stop reason: HOSPADM

## 2024-09-18 RX ORDER — FENTANYL CITRATE 50 UG/ML
INJECTION, SOLUTION INTRAMUSCULAR; INTRAVENOUS AS NEEDED
Status: DISCONTINUED | OUTPATIENT
Start: 2024-09-18 | End: 2024-09-18 | Stop reason: SURG

## 2024-09-18 RX ORDER — TRANEXAMIC ACID 100 MG/ML
INJECTION, SOLUTION INTRAVENOUS AS NEEDED
Status: DISCONTINUED | OUTPATIENT
Start: 2024-09-18 | End: 2024-09-18 | Stop reason: SURG

## 2024-09-18 RX ORDER — MAGNESIUM HYDROXIDE 1200 MG/15ML
LIQUID ORAL AS NEEDED
Status: DISCONTINUED | OUTPATIENT
Start: 2024-09-18 | End: 2024-09-18 | Stop reason: HOSPADM

## 2024-09-18 RX ORDER — FENTANYL CITRATE 50 UG/ML
50 INJECTION, SOLUTION INTRAMUSCULAR; INTRAVENOUS
Status: DISCONTINUED | OUTPATIENT
Start: 2024-09-18 | End: 2024-09-18 | Stop reason: HOSPADM

## 2024-09-18 RX ORDER — EPHEDRINE SULFATE 50 MG/ML
5 INJECTION, SOLUTION INTRAVENOUS ONCE AS NEEDED
Status: DISCONTINUED | OUTPATIENT
Start: 2024-09-18 | End: 2024-09-18 | Stop reason: HOSPADM

## 2024-09-18 RX ORDER — OXYCODONE AND ACETAMINOPHEN 7.5; 325 MG/1; MG/1
1 TABLET ORAL EVERY 4 HOURS PRN
Status: DISCONTINUED | OUTPATIENT
Start: 2024-09-18 | End: 2024-09-18 | Stop reason: HOSPADM

## 2024-09-18 RX ORDER — NALOXONE HCL 0.4 MG/ML
0.2 VIAL (ML) INJECTION AS NEEDED
Status: DISCONTINUED | OUTPATIENT
Start: 2024-09-18 | End: 2024-09-18 | Stop reason: HOSPADM

## 2024-09-18 RX ORDER — LABETALOL HYDROCHLORIDE 5 MG/ML
5 INJECTION, SOLUTION INTRAVENOUS
Status: DISCONTINUED | OUTPATIENT
Start: 2024-09-18 | End: 2024-09-18 | Stop reason: HOSPADM

## 2024-09-18 RX ORDER — GLYCOPYRROLATE 0.2 MG/ML
INJECTION INTRAMUSCULAR; INTRAVENOUS AS NEEDED
Status: DISCONTINUED | OUTPATIENT
Start: 2024-09-18 | End: 2024-09-18 | Stop reason: SURG

## 2024-09-18 RX ORDER — HYDROCODONE BITARTRATE AND ACETAMINOPHEN 7.5; 325 MG/1; MG/1
1 TABLET ORAL EVERY 4 HOURS PRN
Qty: 42 TABLET | Refills: 0 | Status: SHIPPED | OUTPATIENT
Start: 2024-09-18

## 2024-09-18 RX ORDER — HYDROCODONE BITARTRATE AND ACETAMINOPHEN 5; 325 MG/1; MG/1
1 TABLET ORAL ONCE AS NEEDED
Status: DISCONTINUED | OUTPATIENT
Start: 2024-09-18 | End: 2024-09-18 | Stop reason: HOSPADM

## 2024-09-18 RX ORDER — DIPHENHYDRAMINE HYDROCHLORIDE 50 MG/ML
12.5 INJECTION INTRAMUSCULAR; INTRAVENOUS
Status: DISCONTINUED | OUTPATIENT
Start: 2024-09-18 | End: 2024-09-18 | Stop reason: HOSPADM

## 2024-09-18 RX ORDER — LIDOCAINE HYDROCHLORIDE 10 MG/ML
0.5 INJECTION, SOLUTION INFILTRATION; PERINEURAL ONCE AS NEEDED
Status: DISCONTINUED | OUTPATIENT
Start: 2024-09-18 | End: 2024-09-18 | Stop reason: HOSPADM

## 2024-09-18 RX ORDER — ONDANSETRON 2 MG/ML
4 INJECTION INTRAMUSCULAR; INTRAVENOUS ONCE AS NEEDED
Status: DISCONTINUED | OUTPATIENT
Start: 2024-09-18 | End: 2024-09-18 | Stop reason: HOSPADM

## 2024-09-18 RX ORDER — LIDOCAINE HYDROCHLORIDE 20 MG/ML
INJECTION, SOLUTION INFILTRATION; PERINEURAL AS NEEDED
Status: DISCONTINUED | OUTPATIENT
Start: 2024-09-18 | End: 2024-09-18 | Stop reason: SURG

## 2024-09-18 RX ORDER — FENTANYL CITRATE 50 UG/ML
50 INJECTION, SOLUTION INTRAMUSCULAR; INTRAVENOUS ONCE AS NEEDED
Status: COMPLETED | OUTPATIENT
Start: 2024-09-18 | End: 2024-09-18

## 2024-09-18 RX ORDER — DROPERIDOL 2.5 MG/ML
0.62 INJECTION, SOLUTION INTRAMUSCULAR; INTRAVENOUS
Status: DISCONTINUED | OUTPATIENT
Start: 2024-09-18 | End: 2024-09-18 | Stop reason: HOSPADM

## 2024-09-18 RX ORDER — DOCUSATE SODIUM 100 MG/1
200 CAPSULE, LIQUID FILLED ORAL 2 TIMES DAILY
Qty: 60 CAPSULE | Refills: 1 | Status: SHIPPED | OUTPATIENT
Start: 2024-09-18

## 2024-09-18 RX ORDER — SODIUM CHLORIDE, SODIUM LACTATE, POTASSIUM CHLORIDE, CALCIUM CHLORIDE 600; 310; 30; 20 MG/100ML; MG/100ML; MG/100ML; MG/100ML
INJECTION, SOLUTION INTRAVENOUS CONTINUOUS PRN
Status: DISCONTINUED | OUTPATIENT
Start: 2024-09-18 | End: 2024-09-18 | Stop reason: SURG

## 2024-09-18 RX ORDER — FLUMAZENIL 0.1 MG/ML
0.2 INJECTION INTRAVENOUS AS NEEDED
Status: DISCONTINUED | OUTPATIENT
Start: 2024-09-18 | End: 2024-09-18 | Stop reason: HOSPADM

## 2024-09-18 RX ADMIN — SODIUM CHLORIDE, POTASSIUM CHLORIDE, SODIUM LACTATE AND CALCIUM CHLORIDE: 600; 310; 30; 20 INJECTION, SOLUTION INTRAVENOUS at 08:36

## 2024-09-18 RX ADMIN — FENTANYL CITRATE 50 MCG: 50 INJECTION, SOLUTION INTRAMUSCULAR; INTRAVENOUS at 11:19

## 2024-09-18 RX ADMIN — PROPOFOL INJECTABLE EMULSION 250 MG: 10 INJECTION, EMULSION INTRAVENOUS at 08:36

## 2024-09-18 RX ADMIN — FENTANYL CITRATE 50 MCG: 50 INJECTION, SOLUTION INTRAMUSCULAR; INTRAVENOUS at 07:47

## 2024-09-18 RX ADMIN — FENTANYL CITRATE 50 MCG: 50 INJECTION INTRAMUSCULAR; INTRAVENOUS at 08:36

## 2024-09-18 RX ADMIN — SUGAMMADEX 200 MG: 100 INJECTION, SOLUTION INTRAVENOUS at 09:46

## 2024-09-18 RX ADMIN — MIDAZOLAM 1 MG: 1 INJECTION INTRAMUSCULAR; INTRAVENOUS at 07:40

## 2024-09-18 RX ADMIN — DEXAMETHASONE SODIUM PHOSPHATE 8 MG: 4 INJECTION, SOLUTION INTRA-ARTICULAR; INTRALESIONAL; INTRAMUSCULAR; INTRAVENOUS; SOFT TISSUE at 08:36

## 2024-09-18 RX ADMIN — TRANEXAMIC ACID 1000 MG: 100 INJECTION, SOLUTION INTRAVENOUS at 09:35

## 2024-09-18 RX ADMIN — FENTANYL CITRATE 50 MCG: 50 INJECTION, SOLUTION INTRAMUSCULAR; INTRAVENOUS at 10:42

## 2024-09-18 RX ADMIN — MIDAZOLAM 1 MG: 1 INJECTION INTRAMUSCULAR; INTRAVENOUS at 07:47

## 2024-09-18 RX ADMIN — SODIUM CHLORIDE, POTASSIUM CHLORIDE, SODIUM LACTATE AND CALCIUM CHLORIDE 9 ML/HR: 600; 310; 30; 20 INJECTION, SOLUTION INTRAVENOUS at 07:15

## 2024-09-18 RX ADMIN — DEXAMETHASONE SODIUM PHOSPHATE 4 MG: 4 INJECTION, SOLUTION INTRA-ARTICULAR; INTRALESIONAL; INTRAMUSCULAR; INTRAVENOUS; SOFT TISSUE at 07:50

## 2024-09-18 RX ADMIN — LIDOCAINE HYDROCHLORIDE 100 MG: 20 INJECTION, SOLUTION INFILTRATION; PERINEURAL at 08:36

## 2024-09-18 RX ADMIN — HYDROMORPHONE HYDROCHLORIDE 0.5 MG: 1 INJECTION, SOLUTION INTRAMUSCULAR; INTRAVENOUS; SUBCUTANEOUS at 09:56

## 2024-09-18 RX ADMIN — SODIUM CHLORIDE, POTASSIUM CHLORIDE, SODIUM LACTATE AND CALCIUM CHLORIDE: 600; 310; 30; 20 INJECTION, SOLUTION INTRAVENOUS at 09:20

## 2024-09-18 RX ADMIN — ONDANSETRON 4 MG: 2 INJECTION INTRAMUSCULAR; INTRAVENOUS at 09:43

## 2024-09-18 RX ADMIN — SODIUM CHLORIDE 2000 MG: 900 INJECTION INTRAVENOUS at 08:26

## 2024-09-18 RX ADMIN — TRANEXAMIC ACID 1000 MG: 100 INJECTION, SOLUTION INTRAVENOUS at 08:45

## 2024-09-18 RX ADMIN — OXYCODONE HYDROCHLORIDE AND ACETAMINOPHEN 1 TABLET: 7.5; 325 TABLET ORAL at 10:42

## 2024-09-18 RX ADMIN — GLYCOPYRROLATE 0.4 MG: 0.2 INJECTION INTRAMUSCULAR; INTRAVENOUS at 09:14

## 2024-09-18 RX ADMIN — ROCURONIUM BROMIDE 50 MG: 10 INJECTION, SOLUTION INTRAVENOUS at 08:36

## 2024-09-18 RX ADMIN — FENTANYL CITRATE 50 MCG: 50 INJECTION INTRAMUSCULAR; INTRAVENOUS at 09:22

## 2024-09-18 RX ADMIN — MELOXICAM 15 MG: 15 TABLET ORAL at 07:01

## 2024-09-18 RX ADMIN — ROPIVACAINE HYDROCHLORIDE 15 ML: 5 INJECTION EPIDURAL; INFILTRATION; PERINEURAL at 07:50

## 2024-09-18 RX ADMIN — HYDROMORPHONE HYDROCHLORIDE 0.5 MG: 1 INJECTION, SOLUTION INTRAMUSCULAR; INTRAVENOUS; SUBCUTANEOUS at 10:54

## 2024-09-18 NOTE — H&P
ORTHOPEDIC SURGERY PRE-OP HISTORY AND PHYSICAL      Patient: Joey Byrnes  Date of Admission: 9/18/2024  6:12 AM  YOB: 1965  Medical Record Number: [id number]  Attending Physician: Jovanny Merida MD  Consulting Physician: Jovanny Merida MD    CHIEF COMPLAINT: Left Knee Pain.    HISTORY OF PRESENT ILLNESS: Patient is a 59 y.o. year old male presents to [facility] with above complaints. He had a TKA performed by an outside orthopedic surgeon that has been chronically unstable.   The patient failed conservative treatment and patient requested surgical intervention.  The patient presents for a  Left Total knee Revision.    ALLERGIES: No Known Allergies    HOME MEDICATIONS:  Medications Prior to Admission   Medication Sig Dispense Refill Last Dose    acetaminophen (TYLENOL) 500 MG tablet Take 1 tablet by mouth Every 6 (Six) Hours As Needed for Mild Pain.       albuterol sulfate  (90 Base) MCG/ACT inhaler Inhale 2 puffs Every 4 (Four) Hours As Needed for Wheezing.       celecoxib (CeleBREX) 200 MG capsule Take 1 capsule by mouth Daily. TO HOLD 1 WEEK BEFORE SURGERY       cyclobenzaprine (FLEXERIL) 10 MG tablet Take 1 tablet by mouth 3 (Three) Times a Day As Needed for Muscle Spasms. 50 tablet 0     diazePAM (VALIUM) 5 MG tablet Take 1 tablet by mouth Every 8 (Eight) Hours As Needed for Muscle Spasms. (Patient taking differently: Take 1 tablet by mouth Every 8 (Eight) Hours As Needed for Muscle Spasms. WAITING FOR NEW RX (NOT CURRENTLY TAKING)) 50 tablet 0     DULoxetine (CYMBALTA) 60 MG capsule Take 1 capsule by mouth Daily.       pantoprazole (PROTONIX) 40 MG EC tablet Take 1 tablet by mouth Daily.       pregabalin (LYRICA) 75 MG capsule Take 1 capsule by mouth 2 (Two) Times a Day.       traZODone (DESYREL) 50 MG tablet Take 1 tablet by mouth Every Night.          Past Medical History:   Diagnosis Date    Anxiety     Arthritis     Asthma     Back pain     Chronic  "fatigue     Chronic post-traumatic stress disorder (PTSD)     DDD (degenerative disc disease), cervical     DDD (degenerative disc disease), lumbar     Depression     DJD (degenerative joint disease)     Fibromyalgia     Hearing loss     NO HEARING AIDS    History of bleeding ulcers     History of concussion     History of COVID-19     History of mononucleosis     History of sepsis     \"WITH COVID\"    History of skin pruritus     Left knee pain     Migraine     Panic disorder     Sleeping difficulties      Past Surgical History:   Procedure Laterality Date    ANTERIOR CERVICAL DISCECTOMY W/ FUSION N/A 10/31/2018    Procedure: C3-7 CERVICAL DISCECTOMY ANTERIOR FUSION;  Surgeon: Piotr Steven DO;  Location: [facility] MAIN OR;  Service: Orthopedic Spine    COLONOSCOPY      INGUINAL HERNIA REPAIR Right     KNEE ARTHROSCOPY Bilateral     meniscus    TOTAL KNEE ARTHROPLASTY Bilateral     VASECTOMY       Social History     Occupational History    Not on file   Tobacco Use    Smoking status: Never    Smokeless tobacco: Current     Types: Snuff    Tobacco comments:     1 can weekly   Substance and Sexual Activity    Alcohol use: No    Drug use: No    Sexual activity: Not on file      Social History     Social History Narrative    Not on file     Family History   Problem Relation Age of Onset    Malig Hyperthermia Neg Hx        REVIEW OF SYSTEMS:    HEENT: Patient denies any headaches, vision changes, change in hearing, or tinnitus, Patient denies epistaxis, sinus pain, hoarseness, or dysphagia   Pulmonary: Patient denies any cough, congestion, acute change in SOA or wheezing.   Cardiovascular: Patient denies any change in chest pain, dyspnea, palpitations, weakness, intolerance of exercise, varicosities, change in murmur   Gastrointestinal:  Patient denies change in appetite, melena, change in bowel habits.   Genital/Urinary: Patient denies dysuria, change in color of urine, change in frequency of urination, pain with urgency, " change in incontinence, retention.   Musculoskeletal: Patient denies complaints of acute changes in symptoms of other joints not mentioned above.   Neurological: Patient denies changes in dizziness, tremor, ataxia, or difficulty in speaking or changes in memory.   Endocrine system: Patient denies acute changes in tremors, palpitations, polyuria, polydipsia, polyphagia, diaphoresis, exophthalmos, or goiter.   Psychological: Patient denies thoughts/plans or harming self or other; denies acute changes in depression,  insomnia, night terrors, eileen, disorientation.   Skin: Patient denies any bruising, rashes, discoloration, pruritus,or wounds not mentioned in history of present illness or chief complaint above.   Hematopoietic: Patient denies current bleeding, epistaxis, hematuria, or melena.    PHYSICAL EXAM:   Vitals:  Vitals:    09/18/24 0639   BP: 125/84   BP Location: Right arm   Patient Position: Sitting   Pulse: 66   Resp: 18   Temp: 98.1 °F (36.7 °C)   TempSrc: Oral   SpO2: 98%       General:  59 y.o. male who appears about stated age.    Alert, cooperative, in no acute distress                       Head:    Normocephalic, without obvious abnormality, atraumatic   Eyes:            Lids and lashes normal, conjunctivae and sclerae normal, no         icterus, no pallor, corneas clear, PERRLA   Ears:    Ears appear intact with no abnormalities noted   Throat:   No oral lesions, no thrush, oral mucosa moist   Neck:   No adenopathy, supple, trachea midline, no JVD   Back:     Limited exam shows no severe kyphosis present,no visible           erythema, no excessive  tenderness to palpation.    Lungs:     Respirations regular, even and unlabored.     Heart:    Normal rate, Pulses palpable   Chest Wall:    No abnormalities observed.   Abdomen:     Normal bowel sounds, no masses, no organomegaly, soft              non-tender, non-distended, no guarding, no rebound                      tenderness   Rectal:     Deferred    Pulses:   Pulses palpable and equal bilaterally   Skin:   No bleeding, bruising or rash   Lymph nodes:   No palpable adenopathy   Extremities:     Left Knee: Skin intact. Well healed incision. Painful ROM.  NVI distally.  Knee is unstable.    DIAGNOSTIC TEST:  No visits with results within 2 Day(s) from this visit.   Latest known visit with results is:   Pre-Admission Testing on 08/28/2024   Component Date Value Ref Range Status    QT Interval 08/28/2024 434  ms Final    QTC Interval 08/28/2024 434  ms Final    WBC 08/28/2024 4.28  3.40 - 10.80 10*3/mm3 Final    RBC 08/28/2024 4.76  4.14 - 5.80 10*6/mm3 Final    Hemoglobin 08/28/2024 14.5  13.0 - 17.7 g/dL Final    Hematocrit 08/28/2024 44.4  37.5 - 51.0 % Final    MCV 08/28/2024 93.3  79.0 - 97.0 fL Final    MCH 08/28/2024 30.5  26.6 - 33.0 pg Final    MCHC 08/28/2024 32.7  31.5 - 35.7 g/dL Final    RDW 08/28/2024 13.0  12.3 - 15.4 % Final    RDW-SD 08/28/2024 44.2  37.0 - 54.0 fl Final    MPV 08/28/2024 9.7  6.0 - 12.0 fL Final    Platelets 08/28/2024 202  140 - 450 10*3/mm3 Final    Glucose 08/28/2024 108 (H)  65 - 99 mg/dL Final    BUN 08/28/2024 12  6 - 20 mg/dL Final    Creatinine 08/28/2024 0.96  0.76 - 1.27 mg/dL Final    Sodium 08/28/2024 140  136 - 145 mmol/L Final    Potassium 08/28/2024 4.2  3.5 - 5.2 mmol/L Final    Slight hemolysis detected by analyzer. Result may be falsely elevated.    Chloride 08/28/2024 106  98 - 107 mmol/L Final    CO2 08/28/2024 25.0  22.0 - 29.0 mmol/L Final    Calcium 08/28/2024 9.5  8.6 - 10.5 mg/dL Final    BUN/Creatinine Ratio 08/28/2024 12.5  7.0 - 25.0 Final    Anion Gap 08/28/2024 9.0  5.0 - 15.0 mmol/L Final    eGFR 08/28/2024 91.6  >60.0 mL/min/1.73 Final    Hemoglobin A1C 08/28/2024 5.40  4.80 - 5.60 % Final       No results found.      ASSESSMENT:  Left unstable total knee arthroplasty, [employer] implants    * No active hospital problems. *      PLAN:    Left TKA revision with likely polyethylene exchange vs  complete revision.    Risks and benefits of surgical intervention were discussed in detail with the patient.  Risks of infection, fracture, dislocation, extremity length discrepancy, neurovascular injury, persistent pain, medical risks, anesthetic risk, need for additional surgery, deep venous thrombosis, pulmonary embolism and death.      The above diagnosis and treatment plan was discussed with the patient and/or family.  They were educated in both non-surgical and surgical treatment options for their condition.   They were given the opportunity to ask questions and were answered to their satisfaction.  They agreed to proceed with the above treatment plan.        Jovanny Merida MD  Date: 9/18/2024

## 2024-09-18 NOTE — DISCHARGE PLACEMENT REQUEST
"Mcgill, Carlos WOLFF (59 y.o. Male)       Date of Birth   1965    Social Security Number       Address   [address] [place] [address]    Home Phone   [phone]    MRN   [id number]       Denominational   [employer]    Marital Status                               Admission Date   9/18/24    Admission Type   Elective    Admitting Provider   Jovanny Merida MD    Attending Provider       Department, Room/Bed   [facility] OR, [facility] OR       Discharge Date   9/18/2024    Discharge Disposition   Home-Health Care [facility]    Discharge Destination   Home                              Attending Provider: (none)   Allergies: No Known Allergies    Isolation: None   Infection: None   Code Status: Prior    Ht: 170.2 cm (67\")   Wt: 78.7 kg (173 lb 8 oz)    Admission Cmt: None   Principal Problem: Failed total left knee replacement, initial encounter [T84.093A]                   Active Insurance as of 9/18/2024       Primary Coverage       Payor Plan Insurance Group Employer/Plan Group    [facility] CCN OPTUM        Payor Plan Address Payor Plan Phone Number Payor Plan Fax Number Effective Dates    [address] [phone]  12/1/2022 - None Entered    [facility] [id number]         Subscriber Name Subscriber Birth Date Member ID       CARLOS MCGILL MARIELLA 1965 [id number]                     Emergency Contacts        (Rel.) Home Phone Work Phone Mobile Phone    Ibis Mcgill (Spouse) [phone] -- [phone]                "

## 2024-09-18 NOTE — OP NOTE
TOTAL KNEE ARTHROPLASTY REVISION  Procedure Note    Joey Byrnes  9/18/2024    Pre-op Diagnosis: Failed Left Total Knee  Post-op Diagnosis:Same  Procedure: Left  Total Knee Arthroplasty Revision  Surgical Approach: Knee Medial Parapatellar   Surgeon:  Jovanny Merida MD  Anesthesia: General with Block, Anesthesiologist: Caden Davey MD  CRNA: Murtaza Rodriguez CRNA  Staff: Circulator: Porsha Tang RN  Scrub Person: Prosper Batista  Assistant: Rehana Batista APRN CFA  Estimated Blood Loss:100 mL  Specimens:* No orders in the log *   Drains: none  Complications: None    Components Utilized:    Implant Name Type Inv. Item Serial No.  Lot No. LRB No. Used Action   DEV CONTRL TISS STRATAFIX SPIRAL MNCRYL UD 3/0 PLS 30CM - [id number] Implant DEV CONTRL TISS STRATAFIX SPIRAL MNCRYL UD 3/0 PLS 30CM  [employer]  DIV OF J AND J [id number] Left 1 Implanted   DEV CONTRL TISS STRATAFIX PDS PLS OS6 REV SZ1 18IN 45CM - [id number] Implant DEV CONTRL TISS STRATAFIX PDS PLS OS6 REV SZ1 18IN 45CM  [employer]  DIV OF J AND J [id number] Left 2 Implanted   INSRT ART/KN LEGION PS HF XLPE [id number] 11MM - [id number] Implant INSRT ART/KN LEGION PS HF XLPE [id number] 11MM  [employer] [id number] Left 1 Implanted       Indication for Procedure:   The patient is a 59 y.o. male presents today for a total knee arthroplasty revision procedure because of failure of their total knee implants.  The patient had a total knee in 2019 by an orthopedic surgeon at the [facility].  He has been having persistent lateral based knee pain ever since it was performed.  I had performed a right total knee arthroplasty previously that is done well.  He requested a revision of his left knee replacement.  He is found to have some instability of his posterior stabilized design implant.  He also had some snapping of the iliotibial band laterally which was symptomatic.  Was educated in risks of surgery that could include possible risk  of infection, deep venous thrombosis, pulmonary embolism, fracture, neurovascular injury, leg length discrepancy, dislocation, possible persistent pain, need for additional surgeries, anesthetic risks, medical risks including heart attack and stroke, and death.  The discussion occurred in the office pre-operatively, and patient had the opportunity to ask questions, and concerns about the proposed surgery.  The patient also understood that medicine is not an exact science, and that outcomes of the surgical procedure may be less than desired. The patient wished to proceed.       Protocols for intravenous antibiotics and venous thrombosis were followed for this patient.  IV antibiotics were infused prior to surgery and will be discontinued within 24 hours of completion of the surgical procedure.  Thrombosis prophylaxis will be initiated within 24 hours of the completion of the surgical procedure.       Procedure:              After the patient was identified in the preoperative area, and the surgical site confirmed and marked, the patient was brought to the operating room on a stretcher.  They were placed supine on the operating room table and the above anesthetic was placed uneventfully.  A time-out procedure was performed.  The intravenous Ancef antibiotics infusion was completed.  A non-sterile tourniquet was placed on the operative thigh, and was prepped and draped in the usual sterile fashion.  An Esmarch was to exsanguinate the limb, and the tourniquet was inflated.                A 10 blade scalpel was used to make a longitudinal incision from above the patella to medial to the tibial tubercle.  A medial chan-patellar arthrotomy was performed with another 10 blade scalpel.  The synovial fluid was clear, and the medial joint line was elevated subperiosteally with electrocautery and a matson elevator.  The patellar fat pad and scar tissue was removed.  It was quite extensive amount of scar tissue in place.  The  inset patella was found to be stable so it was left intact.  The femoral and tibial implants were found to be stable to the underlying bone and were left intact.  He did have some scar tissue in the lateral recess which was snapping underneath the iliotibial band with flexion extension of the knee.  This was removed.  The polythene component was removed and the knee was retrialed.  He had a size 10 mm polyp thing in place.  11 mm polythene component was inserted.  The knee was ranged from be stable in all planes and patella was tracking centrally.  The polythene component trial was removed and the final path in component was then snapped in the place.  Was confirmed to be well-seated.    The tourniquet was then dropped and there was some bleeding where the synovectomy was completed.  An werewolf electrocautery device had to be used to stop the bleeding.  Hemostasis was obtained.  The wound was then irrigated with the pulse lavage irrigation system with a 3 liter mixture of betadine and normal saline.  The local mixture was injected throughout the knee for post-operative pain control.  The knee was flexed to 90 degrees and the arthrotomy was closed with #1 Strata-fix suture and #1 Vicryl in a interrupted fashion.The deep dermis was closed with 2-0 Vicryl, and the skin was closed with 3-0 Monocryl suture.  Skin glue was applied and sterile dressing were applied.              Sponge and needle counts were completed and were correct.  The patient was awaken from anesthetic and was returned to recovery in stable condition.    Jovanny Merida MD  Date: 9/18/2024    Time: 10:24 EDT

## 2024-09-18 NOTE — PLAN OF CARE
"Goal Outcome Evaluation:  Plan of Care Reviewed With: patient, spouse      Patient is a 59 y.o. male POD 0 L TKA revision and is WBAT - seen today in [facility]. Patient is independent at baseline and lives with his wife, has a rwx from previous surgeries. Pt has 3 CLIFFORD with no steps inside. Pt presents to PT with impaired strength, endurance, and pain limiting overall mobility. Today, patient required CGA for transfers and ambulated 120ft with rwx and SBA-CGA. Demo's fluid movement of rwx with steps, cued for equal step length and relaxing UE/ on rwx to prevent fatigue. Pt did 3 stairs with SBA and several reminders for sequencing - pt attempts step-over-step pattern, noted increased pain and able to correct. Returned to room and educated on activity as tolerated/within moderation as pt reports he has \"overdone it\" on multiple previous surgeries. Based on current clinical presentation, patient okay to DC home today with assist and HHPT to follow up. No further acute PT needs.                                       "

## 2024-09-18 NOTE — THERAPY EVALUATION
"Patient Name: Joey Byrnes  : 1965    MRN: [id number]                              Today's Date: 2024       Admit Date: 2024    Visit Dx:     ICD-10-CM ICD-9-CM   1. Failed total knee, left, initial encounter  T84.093A 996.47     V43.65     Patient Active Problem List   Diagnosis    Chronic post-traumatic stress disorder (PTSD)    Arthritis    Anxiety    Failed total left knee replacement, initial encounter     Past Medical History:   Diagnosis Date    Anxiety     Arthritis     Asthma     Back pain     Chronic fatigue     Chronic post-traumatic stress disorder (PTSD)     DDD (degenerative disc disease), cervical     DDD (degenerative disc disease), lumbar     Depression     DJD (degenerative joint disease)     Fibromyalgia     Hearing loss     NO HEARING AIDS    History of bleeding ulcers     History of concussion     History of COVID-19     History of mononucleosis     History of sepsis     \"WITH COVID\"    History of skin pruritus     Left knee pain     Migraine     Panic disorder     Sleeping difficulties      Past Surgical History:   Procedure Laterality Date    ANTERIOR CERVICAL DISCECTOMY W/ FUSION N/A 10/31/2018    Procedure: C3-7 CERVICAL DISCECTOMY ANTERIOR FUSION;  Surgeon: Piotr Steven DO;  Location: [facility] OR;  Service: Orthopedic Spine    COLONOSCOPY      INGUINAL HERNIA REPAIR Right     KNEE ARTHROSCOPY Bilateral     meniscus    TOTAL KNEE ARTHROPLASTY Bilateral     VASECTOMY        General Information       Row Name 24 1317          Physical Therapy Time and Intention    Document Type evaluation;discharge evaluation/summary  -     Mode of Treatment individual therapy;physical therapy  -       Row Name 24 1317          General Information    Patient Profile Reviewed yes  -     Prior Level of Function independent:;gait;transfer;bed mobility  -     Existing Precautions/Restrictions no known precautions/restrictions  -     Barriers to Rehab none identified  " -[facility] Name 09/18/24 1317          Living Environment    People in Home spouse  -[facility] Name 09/18/24 1317          Home Main Entrance    Number of Stairs, Main Entrance three  -[facility] Name 09/18/24 1317          Stairs Within Home, Primary    Number of Stairs, Within Home, Primary none  -[facility] Name 09/18/24 1317          Cognition    Orientation Status (Cognition) oriented x 4  -[facility] Name 09/18/24 1317          Safety Issues, Functional Mobility    Impairments Affecting Function (Mobility) balance;endurance/activity tolerance;strength;pain;range of motion (ROM)  -               User Key  (r) = Recorded By, (t) = Taken By, (c) = Cosigned By      Initials Name Provider Type     Karissa Barrett PT Physical Therapist                   Mobility       [facility] Name 09/18/24 1317          Bed Mobility    Comment, (Bed Mobility) NT - UIC  -[facility] Name 09/18/24 1317          Sit-Stand Transfer    Sit-Stand Gackle (Transfers) contact guard;verbal cues  -     Assistive Device (Sit-Stand Transfers) walker, front-wheeled  -[facility] Name 09/18/24 1317          Gait/Stairs (Locomotion)    Gackle Level (Gait) contact guard;verbal cues;standby assist  -     Assistive Device (Gait) walker, front-wheeled  -     Distance in Feet (Gait) 120  -     Deviations/Abnormal Patterns (Gait) antalgic;maggy decreased;gait speed decreased;stride length decreased  -     Bilateral Gait Deviations forward flexed posture  -     Left Sided Gait Deviations weight shift ability decreased;heel strike decreased  -     Gackle Level (Stairs) supervision;stand by assist;verbal cues  -     Handrail Location (Stairs) both sides  -     Number of Steps (Stairs) 3  -     Ascending Technique (Stairs) step-to-step  -     Descending Technique (Stairs) step-to-step  -[facility] Name 09/18/24 1317          Mobility    Extremity Weight-bearing Status left lower extremity  -      Left Lower Extremity (Weight-bearing Status) weight-bearing as tolerated (WBAT)  -               User Key  (r) = Recorded By, (t) = Taken By, (c) = Cosigned By      Initials Name Provider Type     Karissa Barrett PT Physical Therapist                   Obj/Interventions       Row Name 09/18/24 1318          Range of Motion Comprehensive    General Range of Motion lower extremity range of motion deficits identified  -     Comment, General Range of Motion Expected post-op ROM deficits, L knee flexion grossly 100 degrees with heel slides  -BH       Row Name 09/18/24 1318          Strength Comprehensive (MMT)    General Manual Muscle Testing (MMT) Assessment lower extremity strength deficits identified  -     Comment, General Manual Muscle Testing (MMT) Assessment Expected post-op strength deficits, BLE grossly 4/5  -BH       Row Name 09/18/24 1318          Motor Skills    Therapeutic Exercise --  5 reps TKA protocol  -BH       Row Name 09/18/24 1318          Balance    Balance Assessment sitting static balance;sitting dynamic balance;standing static balance;standing dynamic balance  -     Static Sitting Balance supervision  -     Dynamic Sitting Balance supervision  -     Position, Sitting Balance sitting in chair  -     Static Standing Balance standby assist  -     Dynamic Standing Balance standby assist;contact guard  -     Position/Device Used, Standing Balance supported;walker, front-wheeled  -     Balance Interventions sitting;standing;sit to stand;supported;static;dynamic  -BH       Row Name 09/18/24 1318          Sensory Assessment (Somatosensory)    Sensory Assessment (Somatosensory) LE sensation intact  -               User Key  (r) = Recorded By, (t) = Taken By, (c) = Cosigned By      Initials Name Provider Type     Karissa Barrett PT Physical Therapist                   Goals/Plan    No documentation.                  Clinical Impression       Row Name 09/18/24 1315           "Pain    Pretreatment Pain Rating 2/10  -     Posttreatment Pain Rating 4/10  [facility]     Pain Location - Side/Orientation Left  -     Pain Location incisional  -     Pain Location - knee  -     Pain Intervention(s) Repositioned;Ambulation/increased activity;Rest;Cold applied  -       Row Name 09/18/24 1317          Plan of Care Review    Plan of Care Reviewed With patient;spouse  -     Outcome Evaluation Patient is a 59 y.o. male POD 0 L TKA revision and is WBAT - seen today in [facility]. Patient is independent at baseline and lives with his wife, has a rwx from previous surgeries. Pt has 3 CLIFFORD with no steps inside. Pt presents to PT with impaired strength, endurance, and pain limiting overall mobility. Today, patient required CGA for transfers and ambulated 120ft with rwx and SBA-CGA. Demo's fluid movement of rwx with steps, cued for equal step length and relaxing UE/ on rwx to prevent fatigue. Pt did 3 stairs with SBA and several reminders for sequencing - pt attempts step-over-step pattern, noted increased pain and able to correct. Returned to room and educated on activity as tolerated/within moderation as pt reports he has \"overdone it\" on multiple previous surgeries. Based on current clinical presentation, patient okay to DC home today with assist and HHPT to follow up. No further acute PT needs.  -       Row Name 09/18/24 1310          Therapy Assessment/Plan (PT)    Therapy Frequency (PT) evaluation only  -       Row Name 09/18/24 1319          Vital Signs    O2 Delivery Pre Treatment room air  -     O2 Delivery Intra Treatment room air  -     O2 Delivery Post Treatment room air  -       Row Name 09/18/24 1319          Positioning and Restraints    Pre-Treatment Position sitting in chair/recliner  -     Post Treatment Position chair  -     In Chair reclined;call light within reach;encouraged to call for assist;with family/caregiver;with [facility]  -               User Key  (r) = Recorded By, " (t) = Taken By, (c) = Cosigned By      Initials Name Provider Type     Karissa Barrett PT Physical Therapist                   Outcome Measures       Row Name 09/18/24 1322          How much help from another person do you currently need...    Turning from your back to your side while in flat bed without using bedrails? 4  -BH     Moving from lying on back to sitting on the side of a flat bed without bedrails? 4  -BH     Moving to and from a bed to a chair (including a wheelchair)? 4  -BH     Standing up from a chair using your arms (e.g., wheelchair, bedside chair)? 4  -BH     Climbing 3-5 steps with a railing? 3  -BH     To walk in hospital room? 4  -     AM-PAC 6 Clicks Score (PT) 23  -     Highest Level of Mobility Goal 7 --> Walk 25 feet or more  -       Row Name 09/18/24 1322          Functional Assessment    Outcome Measure Options AM-PAC 6 Clicks Basic Mobility (PT)  -               User Key  (r) = Recorded By, (t) = Taken By, (c) = Cosigned By      Initials Name Provider Type     Karissa Barrett PT Physical Therapist                                 Physical Therapy Education       Title: PT OT SLP Therapies (Done)       Topic: Physical Therapy (Done)       Point: Mobility training (Done)       Learning Progress Summary             Patient Acceptance, E,TB,D, VU by  at 9/18/2024 1323                         Point: Home exercise program (Done)       Learning Progress Summary             Patient Acceptance, E,TB,D, VU by  at 9/18/2024 1323                         Point: Body mechanics (Done)       Learning Progress Summary             Patient Acceptance, E,TB,D, VU by  at 9/18/2024 1323                         Point: Precautions (Done)       Learning Progress Summary             Patient Acceptance, E,TB,D, VU by  at 9/18/2024 1323                                         User Key       Initials Effective Dates Name Provider Type [facility] 04/08/22 -  Karissa Barrett PT Physical  "Therapist PT                  PT Recommendation and Plan     Plan of Care Reviewed With: patient, spouse  Outcome Evaluation: Patient is a 59 y.o. male POD 0 L TKA revision and is WBAT - seen today in [facility]. Patient is independent at baseline and lives with his wife, has a rwx from previous surgeries. Pt has 3 CLIFFORD with no steps inside. Pt presents to PT with impaired strength, endurance, and pain limiting overall mobility. Today, patient required CGA for transfers and ambulated 120ft with rwx and SBA-CGA. Demo's fluid movement of rwx with steps, cued for equal step length and relaxing UE/ on rwx to prevent fatigue. Pt did 3 stairs with SBA and several reminders for sequencing - pt attempts step-over-step pattern, noted increased pain and able to correct. Returned to room and educated on activity as tolerated/within moderation as pt reports he has \"overdone it\" on multiple previous surgeries. Based on current clinical presentation, patient okay to DC home today with assist and HHPT to follow up. No further acute PT needs.     Time Calculation:   PT Evaluation Complexity  History, PT Evaluation Complexity: 1-2 personal factors and/or comorbidities  Examination of Body Systems (PT Eval Complexity): total of 3 or more elements  Clinical Presentation (PT Evaluation Complexity): stable  Clinical Decision Making (PT Evaluation Complexity): low complexity  Overall Complexity (PT Evaluation Complexity): low complexity     PT Charges       Row Name 09/18/24 1323             Time Calculation    Start Time 1228  -      Stop Time 1245  -      Time Calculation (min) 17 min  -      PT Received On 09/18/24  -         Time Calculation- PT    Total Timed Code Minutes- PT 15 minute(s)  -         Timed Charges    64029 - Gait Training Minutes  8  -BH      53558 - PT Therapeutic Activity Minutes 7  -BH         Total Minutes    Timed Charges Total Minutes 15  -BH       Total Minutes 15  -BH                User Key  (r) = " Recorded By, (t) = Taken By, (c) = Cosigned By      Initials Name Provider Type     Karissa Barrett, PT Physical Therapist                  Therapy Charges for Today       Code Description Service Date Service Provider Modifiers Qty    [id number] HC GAIT TRAINING EA 15 MIN 9/18/2024 Karissa Barrett, PT GP 1    [id number] HC PT EVAL LOW COMPLEXITY 2 9/18/2024 Karissa Barrett, PT GP 1            PT G-Codes  Outcome Measure Options: AM-PAC 6 Clicks Basic Mobility (PT)  AM-PAC 6 Clicks Score (PT): 23  PT Discharge Summary  Anticipated Discharge Disposition (PT): home with assist, home with home health    Karissa Barrett, JACKELINE  9/18/2024

## 2024-09-18 NOTE — DISCHARGE INSTRUCTIONS
What to expect after a Nerve Block    Nerve blocks administered to block pain affect many types of nerves, including those nerves that control movement, pain, and normal sensation. Following a nerve block, you may notice some bruising at the site where the block was given. You may experience sensations such as: numbness of the affected area or limb, tingling, heaviness (that is the limb feels heavy to you), weakness or inability to move the affected arm or leg, or a feeling as if your arm or leg has “fallen asleep.”     A nerve block can last from 2 to 36 hours depending on the medications used.  Usually the weakness wears off first followed by the tingling and heaviness. As the block wears off, you may begin to notice pain; however, this sequence of events may occur in any order. Typically, you will be able to move your limb before you will feel it. Once a nerve block begins to wear off, the effects are usually completely gone within 60 minutes.  If you experience continued side effects that you believe are block related for longer than 48 hours, please call your healthcare provider. Please see block-specific instructions below.    Instructions for any Block involving the leg/foot:   If you have had a leg /foot block, you should not bear weight on the affected leg until the block has worn off. After the block has worn off, weight bearing should be as directed by your surgeon. You may be sent home with crutches. You are at high risk for falling because of the anesthetic effects on your leg. Please use caution when standing or trying to move or walk. Have someone assist you until your leg and foot function have returned to normal.     Protection of a “blocked” limb  After a nerve block, you cannot feel pain, pressure, or extremes of temperature in the affected limb. And because of this, your blocked limb is at more risk for injury. For example, it is possible to burn your limb on an extremely hot surface without  feeling it.     When resting, it is important to reposition your limb periodically to avoid prolonged pressure on it. This may require the use of pillows and padding.    While sleeping, you should avoid rolling onto the affected limb or putting too much pressure on it.     If you have a cast or tight dressing, check the color of your fingers or toes of the affected limb. Call your surgeon if they look discolored (that is, dusky, dark colored).    Use caution in cold weather. Cover your limb appropriately to protect it from the cold.    Pain Management:  Your surgeon will give you a prescription for pain medication. Begin taking this before the nerve block wears off. Bear in mind that sometimes the block can wear off in the middle of the night.

## 2024-09-20 ENCOUNTER — TELEPHONE (OUTPATIENT)
Dept: ORTHOPEDIC SURGERY | Facility: HOSPITAL | Age: 59
End: 2024-09-20
Payer: OTHER GOVERNMENT

## 2024-10-16 ENCOUNTER — TELEPHONE (OUTPATIENT)
Dept: ORTHOPEDIC SURGERY | Facility: HOSPITAL | Age: 59
End: 2024-10-16
Payer: OTHER GOVERNMENT

## 2024-10-16 NOTE — TELEPHONE ENCOUNTER
Called and spoke with Mr. Byrnes to see how he has been doing since his LTK revision 9/18. He said he is doing well. He has completed HH PT and started with OP PT. He has 7 weeks of that left. It seems to be going really well. Mr. Byrnes doesn't have any questions for me at this time. He has my contact information should he need anything.

## (undated) DEVICE — DRSNG SURESITE WNDW 4X4.5

## (undated) DEVICE — MAGNETIC DRAPE: Brand: DEVON

## (undated) DEVICE — PATIENT RETURN ELECTRODE, SINGLE-USE, CONTACT QUALITY MONITORING, ADULT, WITH 9FT CORD, FOR PATIENTS WEIGING OVER 33LBS. (15KG): Brand: MEGADYNE

## (undated) DEVICE — PREMIUM DRY TRAY LF: Brand: MEDLINE INDUSTRIES, INC.

## (undated) DEVICE — NEEDLE, QUINCKE, 18GX3.5": Brand: MEDLINE

## (undated) DEVICE — RECIPROCATING BLADE, DOUBLE SIDED, OFFSET  (70.0 X 0.64 X 12.6MM)

## (undated) DEVICE — GLV SURG SENSICARE MICRO PF LF 8 STRL

## (undated) DEVICE — CODMAN® SURGICAL PATTIES 3/4" X 3/4" (1.91CM X 1.91CM): Brand: CODMAN®

## (undated) DEVICE — DISPOSABLE BIPOLAR CABLE 12FT. (3.6M): Brand: KIRWAN

## (undated) DEVICE — MAT FLR ABSORBENT LG 4FT 10 2.5FT

## (undated) DEVICE — TRAP FLD MINIVAC MEGADYNE 100ML

## (undated) DEVICE — GLV SURG SENSICARE PI LF PF 8 GRN STRL

## (undated) DEVICE — ELECTRD BLD EDGE/INSUL1P 2.4X5.1MM STRL

## (undated) DEVICE — SOL ISO/ALC 70PCT 4OZ

## (undated) DEVICE — LIMB HOLDER, WRIST/ANKLE: Brand: DEROYAL

## (undated) DEVICE — PREP SOL POVIDONE/IODINE BT 4OZ

## (undated) DEVICE — CONN TBG Y 5 IN 1 LF STRL

## (undated) DEVICE — YANKAUER: Brand: DEROYAL

## (undated) DEVICE — DISPOSABLE TOURNIQUET CUFF SINGLE BLADDER, SINGLE PORT AND QUICK CONNECT CONNECTOR: Brand: COLOR CUFF

## (undated) DEVICE — STRAP STIRUP WO/ RNG

## (undated) DEVICE — BNDG,ELSTC,MATRIX,STRL,6"X5YD,LF,HOOK&LP: Brand: MEDLINE

## (undated) DEVICE — DRSNG TELFA PAD NONADH STR 1S 3X4IN

## (undated) DEVICE — DUAL CUT SAGITTAL BLADE

## (undated) DEVICE — SUT SILK 2/0 TIES 18IN A185H

## (undated) DEVICE — DRAPE,U/ SHT,SPLIT,PLAS,STERIL: Brand: MEDLINE

## (undated) DEVICE — SUT SILK 2/0 FS BLK 18IN 685G

## (undated) DEVICE — BNDG,ELSTC,MATRIX,STRL,4"X5YD,LF,HOOK&LP: Brand: MEDLINE

## (undated) DEVICE — TEMPORARY FIXATION PIN: Brand: TRESTLE LUXE

## (undated) DEVICE — DRP MICROSCOPE 4 BINOCULAR CV 54X150IN

## (undated) DEVICE — APPL CHLORAPREP HI/LITE 26ML ORNG

## (undated) DEVICE — SPNG DISECTOR KTNER XRAY COTN 1/4X9/16IN PK/5

## (undated) DEVICE — SOL NACL 0.9PCT 100ML SGL

## (undated) DEVICE — INTENDED FOR TISSUE SEPARATION, AND OTHER PROCEDURES THAT REQUIRE A SHARP SURGICAL BLADE TO PUNCTURE OR CUT.: Brand: BARD-PARKER ® CARBON RIB-BACK BLADES

## (undated) DEVICE — GLV SURG BIOGEL LTX PF 8 1/2

## (undated) DEVICE — PK NEURO SPINE 40

## (undated) DEVICE — PENCL SMOKE/EVAC MEGADYNE TELESCP 10FT

## (undated) DEVICE — ANTIBACTERIAL UNDYED BRAIDED (POLYGLACTIN 910), SYNTHETIC ABSORBABLE SUTURE: Brand: COATED VICRYL

## (undated) DEVICE — SYRINGE, LUER LOCK, 60ML: Brand: MEDLINE

## (undated) DEVICE — OPTIFOAM GENTLE SA, POSTOP, 4X12: Brand: MEDLINE

## (undated) DEVICE — ADHS SKIN DERMABOND TOP ADVANCED

## (undated) DEVICE — SMOKE EVACUATION TUBING WITH 7/8 IN TO 1/4 IN REDUCER: Brand: BUFFALO FILTER

## (undated) DEVICE — APPL DURAPREP IODOPHOR APL 26ML

## (undated) DEVICE — WEREWOLF FASTSEAL 6.0 HEMOSTASIS WAND: Brand: FASTSEAL 6.0 HEMOSTASIS WAND

## (undated) DEVICE — GLV SURG BIOGEL LTX PF 8

## (undated) DEVICE — 1010 S-DRAPE TOWEL DRAPE 10/BX: Brand: STERI-DRAPE™

## (undated) DEVICE — ADHS SKIN SURG TISS VISC PREMIERPRO EXOFIN HI/VISC FAST/DRY

## (undated) DEVICE — PK KN TOTL 40

## (undated) DEVICE — 3.0MM NEURO (MATCH HEAD) LESS AGGRESSIVE

## (undated) DEVICE — FIXED 2.3MM DRILL BIT, 12MM: Brand: TRESTLE LUXE